# Patient Record
Sex: MALE | Race: WHITE | NOT HISPANIC OR LATINO
[De-identification: names, ages, dates, MRNs, and addresses within clinical notes are randomized per-mention and may not be internally consistent; named-entity substitution may affect disease eponyms.]

---

## 2021-02-09 ENCOUNTER — TRANSCRIPTION ENCOUNTER (OUTPATIENT)
Age: 61
End: 2021-02-09

## 2023-06-02 PROBLEM — Z00.00 ENCOUNTER FOR PREVENTIVE HEALTH EXAMINATION: Status: ACTIVE | Noted: 2023-06-02

## 2023-06-05 ENCOUNTER — NON-APPOINTMENT (OUTPATIENT)
Age: 63
End: 2023-06-05

## 2023-06-05 ENCOUNTER — APPOINTMENT (OUTPATIENT)
Dept: DERMATOLOGY | Facility: CLINIC | Age: 63
End: 2023-06-05
Payer: MEDICAID

## 2023-06-05 DIAGNOSIS — D22.9 MELANOCYTIC NEVI, UNSPECIFIED: ICD-10-CM

## 2023-06-05 DIAGNOSIS — L81.4 OTHER MELANIN HYPERPIGMENTATION: ICD-10-CM

## 2023-06-05 DIAGNOSIS — Z12.83 ENCOUNTER FOR SCREENING FOR MALIGNANT NEOPLASM OF SKIN: ICD-10-CM

## 2023-06-05 DIAGNOSIS — A63.0 ANOGENITAL (VENEREAL) WARTS: ICD-10-CM

## 2023-06-05 PROCEDURE — 54065 DESTRUCTION PENIS LESION(S): CPT | Mod: 59

## 2023-06-05 PROCEDURE — 46916 CRYOSURGERY ANAL LESION(S): CPT | Mod: 59

## 2023-06-05 PROCEDURE — 99203 OFFICE O/P NEW LOW 30 MIN: CPT | Mod: 25

## 2023-06-05 NOTE — ASSESSMENT
[FreeTextEntry1] : 1) benign findings as above- education\par \par 2) anogenital warts\par education and prognosis\par The specified lesions were treated with liquid nitrogen cryotherapy.  Discussed risks including pain, blistering, crusting, discoloration, recurrence.\par \par cautery to skin tag near right eye\par -treated with cautery at setting of 2.5; SED including burning, scarring, and incomplete tx. patient verbalized understanding\par \par f/u 3 weeks

## 2023-06-05 NOTE — PHYSICAL EXAM
[FreeTextEntry3] : AAOx3, pleasant, NAD, no visual lymphadenopathy\par hair, scalp, face, nose, eyelids, ears, lips, oropharynx, neck, chest, abdomen, back, right arm, left arm, nails, and hands examined with all normal findings,\par pertinent findings include:\par \par multiple benign nevi and lentigines\par keratotic papule with black dots on surface on scrotum, penis, and anus

## 2023-06-05 NOTE — HISTORY OF PRESENT ILLNESS
[FreeTextEntry1] : skin check [de-identified] : 62 year old male here for skin check. hx of HPV. present on scrotum, penis and anus.

## 2023-07-10 ENCOUNTER — APPOINTMENT (OUTPATIENT)
Dept: DERMATOLOGY | Facility: CLINIC | Age: 63
End: 2023-07-10

## 2024-04-04 ENCOUNTER — EMERGENCY (EMERGENCY)
Facility: HOSPITAL | Age: 64
LOS: 0 days | Discharge: HOME | End: 2024-04-04
Attending: STUDENT IN AN ORGANIZED HEALTH CARE EDUCATION/TRAINING PROGRAM
Payer: MEDICAID

## 2024-04-04 VITALS
RESPIRATION RATE: 20 BRPM | HEART RATE: 56 BPM | OXYGEN SATURATION: 99 % | TEMPERATURE: 98 F | DIASTOLIC BLOOD PRESSURE: 97 MMHG | SYSTOLIC BLOOD PRESSURE: 149 MMHG

## 2024-04-04 VITALS — HEIGHT: 68 IN | WEIGHT: 175.05 LBS

## 2024-04-04 DIAGNOSIS — N40.0 BENIGN PROSTATIC HYPERPLASIA WITHOUT LOWER URINARY TRACT SYMPTOMS: ICD-10-CM

## 2024-04-04 DIAGNOSIS — G43.109 MIGRAINE WITH AURA, NOT INTRACTABLE, WITHOUT STATUS MIGRAINOSUS: ICD-10-CM

## 2024-04-04 DIAGNOSIS — R51.9 HEADACHE, UNSPECIFIED: ICD-10-CM

## 2024-04-04 DIAGNOSIS — R29.700 NIHSS SCORE 0: ICD-10-CM

## 2024-04-04 LAB
ALBUMIN SERPL ELPH-MCNC: 4.6 G/DL — SIGNIFICANT CHANGE UP (ref 3.3–5)
ALP SERPL-CCNC: 66 U/L — SIGNIFICANT CHANGE UP (ref 40–120)
ALT FLD-CCNC: 37 U/L — SIGNIFICANT CHANGE UP (ref 12–78)
ANION GAP SERPL CALC-SCNC: 5 MMOL/L — SIGNIFICANT CHANGE UP (ref 5–17)
APTT BLD: 30.2 SEC — SIGNIFICANT CHANGE UP (ref 24.5–35.6)
AST SERPL-CCNC: 23 U/L — SIGNIFICANT CHANGE UP (ref 15–37)
BASOPHILS # BLD AUTO: 0.03 K/UL — SIGNIFICANT CHANGE UP (ref 0–0.2)
BASOPHILS NFR BLD AUTO: 0.5 % — SIGNIFICANT CHANGE UP (ref 0–2)
BILIRUB SERPL-MCNC: 0.4 MG/DL — SIGNIFICANT CHANGE UP (ref 0.2–1.2)
BUN SERPL-MCNC: 22 MG/DL — SIGNIFICANT CHANGE UP (ref 7–23)
CALCIUM SERPL-MCNC: 9.7 MG/DL — SIGNIFICANT CHANGE UP (ref 8.5–10.1)
CHLORIDE SERPL-SCNC: 105 MMOL/L — SIGNIFICANT CHANGE UP (ref 96–108)
CO2 SERPL-SCNC: 28 MMOL/L — SIGNIFICANT CHANGE UP (ref 22–31)
CREAT SERPL-MCNC: 1.03 MG/DL — SIGNIFICANT CHANGE UP (ref 0.5–1.3)
EGFR: 82 ML/MIN/1.73M2 — SIGNIFICANT CHANGE UP
EOSINOPHIL # BLD AUTO: 0.15 K/UL — SIGNIFICANT CHANGE UP (ref 0–0.5)
EOSINOPHIL NFR BLD AUTO: 2.7 % — SIGNIFICANT CHANGE UP (ref 0–6)
GLUCOSE SERPL-MCNC: 131 MG/DL — HIGH (ref 70–99)
HCT VFR BLD CALC: 51.8 % — HIGH (ref 39–50)
HGB BLD-MCNC: 18.2 G/DL — HIGH (ref 13–17)
IMM GRANULOCYTES NFR BLD AUTO: 0.5 % — SIGNIFICANT CHANGE UP (ref 0–0.9)
INR BLD: 0.9 RATIO — SIGNIFICANT CHANGE UP (ref 0.85–1.18)
LYMPHOCYTES # BLD AUTO: 1.17 K/UL — SIGNIFICANT CHANGE UP (ref 1–3.3)
LYMPHOCYTES # BLD AUTO: 20.8 % — SIGNIFICANT CHANGE UP (ref 13–44)
MCHC RBC-ENTMCNC: 30 PG — SIGNIFICANT CHANGE UP (ref 27–34)
MCHC RBC-ENTMCNC: 35.1 GM/DL — SIGNIFICANT CHANGE UP (ref 32–36)
MCV RBC AUTO: 85.5 FL — SIGNIFICANT CHANGE UP (ref 80–100)
MONOCYTES # BLD AUTO: 0.53 K/UL — SIGNIFICANT CHANGE UP (ref 0–0.9)
MONOCYTES NFR BLD AUTO: 9.4 % — SIGNIFICANT CHANGE UP (ref 2–14)
NEUTROPHILS # BLD AUTO: 3.72 K/UL — SIGNIFICANT CHANGE UP (ref 1.8–7.4)
NEUTROPHILS NFR BLD AUTO: 66.1 % — SIGNIFICANT CHANGE UP (ref 43–77)
PLATELET # BLD AUTO: 157 K/UL — SIGNIFICANT CHANGE UP (ref 150–400)
POTASSIUM SERPL-MCNC: 4.3 MMOL/L — SIGNIFICANT CHANGE UP (ref 3.5–5.3)
POTASSIUM SERPL-SCNC: 4.3 MMOL/L — SIGNIFICANT CHANGE UP (ref 3.5–5.3)
PROT SERPL-MCNC: 8.2 GM/DL — SIGNIFICANT CHANGE UP (ref 6–8.3)
PROTHROM AB SERPL-ACNC: 10.2 SEC — SIGNIFICANT CHANGE UP (ref 9.5–13)
RBC # BLD: 6.06 M/UL — HIGH (ref 4.2–5.8)
RBC # FLD: 13.2 % — SIGNIFICANT CHANGE UP (ref 10.3–14.5)
SODIUM SERPL-SCNC: 138 MMOL/L — SIGNIFICANT CHANGE UP (ref 135–145)
TROPONIN I, HIGH SENSITIVITY RESULT: 5.68 NG/L — SIGNIFICANT CHANGE UP
WBC # BLD: 5.63 K/UL — SIGNIFICANT CHANGE UP (ref 3.8–10.5)
WBC # FLD AUTO: 5.63 K/UL — SIGNIFICANT CHANGE UP (ref 3.8–10.5)

## 2024-04-04 PROCEDURE — 99285 EMERGENCY DEPT VISIT HI MDM: CPT

## 2024-04-04 PROCEDURE — 80053 COMPREHEN METABOLIC PANEL: CPT

## 2024-04-04 PROCEDURE — 99283 EMERGENCY DEPT VISIT LOW MDM: CPT

## 2024-04-04 PROCEDURE — 99284 EMERGENCY DEPT VISIT MOD MDM: CPT | Mod: 25

## 2024-04-04 PROCEDURE — 85730 THROMBOPLASTIN TIME PARTIAL: CPT

## 2024-04-04 PROCEDURE — 70498 CT ANGIOGRAPHY NECK: CPT | Mod: 26,MC

## 2024-04-04 PROCEDURE — 85025 COMPLETE CBC W/AUTO DIFF WBC: CPT

## 2024-04-04 PROCEDURE — 36415 COLL VENOUS BLD VENIPUNCTURE: CPT

## 2024-04-04 PROCEDURE — 70450 CT HEAD/BRAIN W/O DYE: CPT | Mod: 26,MC,59

## 2024-04-04 PROCEDURE — 96374 THER/PROPH/DIAG INJ IV PUSH: CPT | Mod: XU

## 2024-04-04 PROCEDURE — 0042T: CPT | Mod: MC

## 2024-04-04 PROCEDURE — 70450 CT HEAD/BRAIN W/O DYE: CPT | Mod: MC

## 2024-04-04 PROCEDURE — 96375 TX/PRO/DX INJ NEW DRUG ADDON: CPT | Mod: XU

## 2024-04-04 PROCEDURE — 70496 CT ANGIOGRAPHY HEAD: CPT | Mod: 26,MC

## 2024-04-04 PROCEDURE — 82962 GLUCOSE BLOOD TEST: CPT

## 2024-04-04 PROCEDURE — 84484 ASSAY OF TROPONIN QUANT: CPT

## 2024-04-04 PROCEDURE — 70498 CT ANGIOGRAPHY NECK: CPT | Mod: MC

## 2024-04-04 PROCEDURE — 85610 PROTHROMBIN TIME: CPT

## 2024-04-04 PROCEDURE — 70496 CT ANGIOGRAPHY HEAD: CPT | Mod: MC

## 2024-04-04 RX ORDER — DIPHENHYDRAMINE HCL 50 MG
25 CAPSULE ORAL ONCE
Refills: 0 | Status: COMPLETED | OUTPATIENT
Start: 2024-04-04 | End: 2024-04-04

## 2024-04-04 RX ORDER — ACETAMINOPHEN 500 MG
650 TABLET ORAL ONCE
Refills: 0 | Status: COMPLETED | OUTPATIENT
Start: 2024-04-04 | End: 2024-04-04

## 2024-04-04 RX ORDER — SODIUM CHLORIDE 9 MG/ML
1000 INJECTION INTRAMUSCULAR; INTRAVENOUS; SUBCUTANEOUS ONCE
Refills: 0 | Status: COMPLETED | OUTPATIENT
Start: 2024-04-04 | End: 2024-04-04

## 2024-04-04 RX ORDER — METOCLOPRAMIDE HCL 10 MG
10 TABLET ORAL ONCE
Refills: 0 | Status: COMPLETED | OUTPATIENT
Start: 2024-04-04 | End: 2024-04-04

## 2024-04-04 RX ORDER — KETOROLAC TROMETHAMINE 30 MG/ML
15 SYRINGE (ML) INJECTION ONCE
Refills: 0 | Status: DISCONTINUED | OUTPATIENT
Start: 2024-04-04 | End: 2024-04-04

## 2024-04-04 RX ADMIN — Medication 15 MILLIGRAM(S): at 21:18

## 2024-04-04 RX ADMIN — SODIUM CHLORIDE 1000 MILLILITER(S): 9 INJECTION INTRAMUSCULAR; INTRAVENOUS; SUBCUTANEOUS at 21:07

## 2024-04-04 RX ADMIN — Medication 10 MILLIGRAM(S): at 21:21

## 2024-04-04 RX ADMIN — Medication 650 MILLIGRAM(S): at 21:18

## 2024-04-04 RX ADMIN — Medication 25 MILLIGRAM(S): at 21:19

## 2024-04-04 NOTE — ED PROVIDER NOTE - OBJECTIVE STATEMENT
62 y/o male with PMHx of migraines with aura, BPH presents with acute onset left eye vision loss, painless approximately 1 hours PTA, lasted 20 minutes and is now resolved. Pt was outside then came inside and could not see from left eye, no headache, no numbness no weakness, Pt without complaints at this time

## 2024-04-04 NOTE — ED PROVIDER NOTE - PATIENT PORTAL LINK FT
You can access the FollowMyHealth Patient Portal offered by [facility] by registering at the following website: [email or web address]. By joining [facility]’s FollowMyHealth portal, you will also be able to view your health information using other applications (apps) compatible with our system.

## 2024-04-04 NOTE — ED ADULT NURSE NOTE - NSFALLUNIVINTERV_ED_ALL_ED
Bed/Stretcher in lowest position, wheels locked, appropriate side rails in place/Call bell, personal items and telephone in reach/Instruct patient to call for assistance before getting out of bed/chair/stretcher/Non-slip footwear applied when patient is off stretcher/[place] to call system/Physically safe environment - no spills, clutter or unnecessary equipment/Purposeful proactive rounding/Room/bathroom lighting operational, light cord in reach

## 2024-04-04 NOTE — STROKE CODE NOTE - NSMDCONSULT QTN_Y FT
After Visit Summary   3/3/2017    Jacquie Eugene    MRN: [id number]           Diagnoses this Visit     Iron deficiency anemia following bariatric surgery    -  Primary       Allergies     Vicodin [Hydrocodone-Acetaminophen]     vomiting      Your Patient is a 63y old  Male who presents with a chief complaint of visual disturbance    HPI:  64 yo male PMH migraines with aura, BPH presented with acute onset Left eye visual disturbance. As per pt, he had just walked outside into the sunlight when he noted a "swirling, fractal-like" line under his central vision in his Left eye. States it was painless and resolved after approx 20 minutes. Pt has had similar events int he past but states they were always followed by a headache and this time, they were not. At the time of my exam pt appears to be comfortable, in NAD. Pt denies HA, visual changes, focal numb / ting / weak, word finding difficulty, neck stiffness, photophobia.     NIH - 0        FAMILY HISTORY:  Neg for stroke  Noncontributory         Social Hx:  Nonsmoker, no drug or alcohol use        Allergies  No Known Allergies        MEDICATIONS reviewed         ROS: Pertinent positives in HPI, all other ROS were reviewed and are negative.          Physical Exam:  Constitutional: Awake / alert  HEENT: PERRLA, EOMI  Neck: Supple  Respiratory: Breath sounds are clear bilaterally  Cardiovascular: S1 and S2, regular rhythm  Gastrointestinal: Soft, NT/ND  Extremities:  no edema  Musculoskeletal: no abnormal movements  Skin: No rashes    Neurological Exam:  HF: A x O x 3, follows commands, normal affect, speech fluent  CN: PERRL, EOMI, no NLFD, tongue midline  Motor: Strength 5/5 in all 4 ext, normal bulk and tone  Sens: Intact to light touch  Reflexes: Symmetric and normal, no clonus, no Haynes's   Coord:  No FNFA, dysmetria, AMBER intact   Gait/Balance: Cannot test        Labs:                        18.2   5.63  )-----------( 157      ( 04 Apr 2024 17:34 )             51.8     PT/INR - ( 04 Apr 2024 17:34 )   PT: 10.2 sec;   INR: 0.90 ratio       PTT - ( 04 Apr 2024 17:34 )  PTT:30.2 sec        Radiology report:  CT Brain Stroke Protocol (04.04.24 @ 17:51) >  1. No intracranial hemorrhage is appreciated.  2. No intracranial mass lesion is appreciated.    CT Angio Neck Stroke Protocol w/ IV Cont (04.04.24 @ 18:00) >  1.   Right carotid system: No hemodynamically significant stenosis.  2.   Left carotid system:     Atherosclerotic plaque carotid bulb without    hemodynamically significant stenosis.  3.   Vertebral circulation:    Patent.  4.  Anterior intracranial circulation:     Suspect early intracranial   atherosclerosis right MCA branching.  5.  Posterior intracranial circulation:    Unremarkable.  6.  No large vessel occlusion  7.  Brain perfusion:   No acute infarction of the brain is convincingly   demonstrated.        A/P:  64 yo male PMH migraines with aura, BPH presented with acute onset Left eye visual disturbance. As per pt, he had just walked outside into the sunlight when he noted a "swirling, fractal-like" line under his central vision in his Left eye. States it was painless and resolved after approx 20 minutes. Pt has had similar events int he past but states they were always followed by a headache and this time, they were not. CT / CTA neg for acute path    - No IV tpa given because NIH 0, symptoms more c/w occular migraine than stroke  - No further w/u indicated at this time  - Pt can f/u with neurologist on a non-urgent basis for migraines  - Instructed pt if symptoms return or persist to seek medical attention  - Now stable and clear for discharge from neurologic standpoint with close outpatient follow up.     Discussed with Dr Sahu

## 2024-04-04 NOTE — ED PROVIDER NOTE - PROGRESS NOTE DETAILS
Pt evaluated by Dr. Sahu neurology attending, all imaging reviewed, no infarct, likely occular migraine, pt is safe for discharge. On reassessment pt now has HA, will provide migraine cocktail and plan for dc when pt feeling better Dr. Price ED attending [place]: Received signout from Dr. Price that after meds for migraine, patient may be discharged if feeling better.  Meds and IV fluids given. Patient on his phone, comfortable, no complaints.  May be discharged after IV fluids.

## 2024-04-04 NOTE — ED PROVIDER NOTE - CARE PROVIDER_API CALL
Emmett Sahu  Neurology  [address] [place] [address], [facility] [address]  [place] [address]  Phone: [phone]  Fax: [phone]  Follow Up Time:

## 2024-04-04 NOTE — ED ADULT NURSE NOTE - CHIEF COMPLAINT QUOTE
Patient presents to the ER with complaints of vision loss to left eye approx 20 minutes prior to arrival which resolved at this time. NIHSS at this time 0. Patient denies chest pain, shortness of breath, N/V/D, fever. Hx: migraines  MD Price notified for eval, code stroke called at [phone].

## 2024-04-04 NOTE — ED ADULT NURSE NOTE - NS ED NURSE LEVEL OF CONSCIOUSNESS AFFECT
Calm/Appropriate

## 2024-04-04 NOTE — ED PROVIDER NOTE - NSFOLLOWUPINSTRUCTIONS_ED_ALL_ED_FT
See your neurologist within 1-2 days.  Take tylenol or ibuprofen or aleve for headache as needed.   Keep well hydrated.  Referral given for neurologist if you do not have one.     Migraine Headache  A migraine headache is a very strong throbbing pain on one or both sides of your head. This type of headache can also cause other symptoms. It can last from 4 hours to 3 days. Talk with your doctor about what things may bring on (trigger) this condition.    What are the causes?  The exact cause of a migraine is not known. This condition may be brought on or caused by:  Smoking.  Medicines, such as:  Medicine used to treat chest pain (nitroglycerin).  Birth control pills.  Estrogen.  Some blood pressure medicines.  Certain substances in some foods or drinks.  Foods and drinks, such as:  Cheese.  Chocolate.  Alcohol.  Caffeine.  Doing physical activity that is very hard.  Other things that may trigger a migraine headache include:  Periods.  Pregnancy.  Hunger.  Stress.  Getting too much or too little sleep.  Weather changes.  Feeling tired (fatigue).  What increases the risk?  Being 25–55 years old.  Being female.  Having a family history of migraine headaches.  Being .  Having a mental health condition, such as being sad (depressed) or feeling worried or nervous (anxious).  Being very overweight (obese).  What are the signs or symptoms?  A throbbing pain. This pain may:  Happen in any area of the head, such as on one or both sides.  Make it hard to do daily activities.  Get worse with physical activity.  Get worse around bright lights, loud noises, or smells.  Other symptoms may include:  Feeling like you may vomit (nauseous).  Vomiting.  Dizziness.  Before a migraine headache starts, you may get warning signs (an aura). An aura may include:  Seeing flashing lights or having blind spots.  Seeing bright spots, halos, or zigzag lines.  Having tunnel vision or blurred vision.  Having numbness or a tingling feeling.  Having trouble talking.  Having weak muscles.  After a migraine ends, you may have symptoms. These may include:  Tiredness.  Trouble thinking (concentrating).  How is this treated?  Taking medicines that:  Relieve pain.  Relieve the feeling like you may vomit.  Prevent migraine headaches.  Treatment may also include:  Acupuncture.  Lifestyle changes like avoiding foods that bring on migraine headaches.  Learning ways to control your body functions (biofeedback).  Therapy to help you know and deal with negative thoughts (cognitive behavioral therapy).  Follow these instructions at home:  Medicines    Take over-the-counter and prescription medicines only as told by your doctor.  If told, take steps to prevent problems with pooping (constipation). You may need to:  Drink enough fluid to keep your pee (urine) pale yellow.  Take medicines. You will be told what medicines to take.  Eat foods that are high in fiber. These include beans, whole grains, and fresh fruits and vegetables.  Limit foods that are high in fat and sugar. These include fried or sweet foods.  Ask your doctor if you should avoid driving or using machines while you are taking your medicine.    Lifestyle    A person sitting on the floor doing yoga.  Do not drink alcohol.  Do not smoke or use any products that contain nicotine or tobacco. If you need help quitting, ask your doctor.  Get 7–9 hours of sleep each night, or the amount recommended by your doctor.  Find ways to deal with stress, such as meditation, deep breathing, or yoga.  Try to exercise often. This can help lessen how bad and how often your migraines happen.  General instructions    Keep a journal to find out what may bring on your migraine headaches. This can help you avoid those things. For example, write down:  What you eat and drink.  How much sleep you get.  Any change to your medicines or diet.  If you have a migraine headache:  Avoid things that make your symptoms worse, such as bright lights.  Lie down in a dark, quiet room.  Do not drive or use machinery.  Ask your doctor what activities are safe for you.  Where to find more information  Coalition for Headache and Migraine Patients (CHAMP): headachemigraine.org  American Migraine Foundation: americanmigrainefoundation.org  National Headache Foundation: headaches.org  Contact a doctor if:  You get a migraine headache that is different or worse than others you have had.  You have more than 15 days of headaches in one month.  Get help right away if:  Your migraine headache gets very bad.  Your migraine headache lasts more than 72 hours.  You have a fever or stiff neck.  You have trouble seeing.  Your muscles feel weak or like you cannot control them.  You lose your balance a lot.  You have trouble walking.  You faint.  You have a seizure.  This information is not intended to replace advice given to you by your health care provider. Make sure you discuss any questions you have with your health care provider.    Document Revised: 08/14/2023 Document Reviewed: 08/14/2023  Elsevier Patient Education © 2024 [employer]  [employer] logo  Terms and Conditions  Privacy Policy  Editorial Policy  All content on this site: Copyright © 2024 Elsevier, its licensors, and contributors. All rights are reserved, including those for text and data mining, AI training, and similar technologies. For all open access content, the Creative Commons licensing terms apply.  Cookies are used by this site. To decline or learn more, visit our Cookies page.  [employer]

## 2024-04-04 NOTE — ED ADULT NURSE NOTE - OBJECTIVE STATEMENT
Pt presents c/o stroke-like symptoms. Pt stated at around 17:00, he suddenly developed blurred vision with pale spots, and his left pupil was larger than his other. This lasted for about 10 minutes and then returned to normal. Pt did not have pain afterwords like previous migraines in the pas and came to  for evaluation. Pt had PMH of migraines, last one over 14 years ago.

## 2024-04-04 NOTE — ED ADULT TRIAGE NOTE - CHIEF COMPLAINT QUOTE
Patient presents to the ER with complaints of vision loss to left eye approx 20 minutes prior to arrival which resolved at this time. NIHSS at this time 0. Patient denies chest pain, shortness of breath, N/V/D, fever. Hx: migraines  MD Price notified for eval, code stroke called at [phone].

## 2024-06-07 ENCOUNTER — NON-APPOINTMENT (OUTPATIENT)
Age: 64
End: 2024-06-07

## 2024-06-11 ENCOUNTER — NON-APPOINTMENT (OUTPATIENT)
Age: 64
End: 2024-06-11

## 2024-06-11 ENCOUNTER — APPOINTMENT (OUTPATIENT)
Dept: ORTHOPEDIC SURGERY | Facility: CLINIC | Age: 64
End: 2024-06-11
Payer: MEDICAID

## 2024-06-11 DIAGNOSIS — M17.0 BILATERAL PRIMARY OSTEOARTHRITIS OF KNEE: ICD-10-CM

## 2024-06-11 PROCEDURE — 99204 OFFICE O/P NEW MOD 45 MIN: CPT

## 2024-06-11 PROCEDURE — 73564 X-RAY EXAM KNEE 4 OR MORE: CPT | Mod: 50

## 2024-06-11 NOTE — HISTORY OF PRESENT ILLNESS
[de-identified] : 62yo male presents complaining of bilateral knee pain left worse than right for several years.  He reports being very active enjoys hiking walking cycling.  Pain is anterior medial aspect of both knees.  Reports intermittent swelling.  He states he is done physical therapy in the past to focus on quadricep strengthening.  He is on cortisone shots in the past as well with minimal relief.  He states insurance does not approve hyaluronic acid injections.  No specific injuries.  He is here today for further evaluation.  Is looking for a more permanent fix for his issues today.  Denies numbness tingling  The patient's past medical history, past surgical history, medications, allergies, and social history were reviewed by me today with the patient and documented accordingly. In addition, the patient's family history, which is noncontributory to this visit, was also reviewed.

## 2024-06-11 NOTE — DISCUSSION/SUMMARY
[de-identified] : Bilateral knee osteoarthritis left more symptomatic than right.  I discussed the treatment of degenerative arthritis with the patient at length today. I described the spectrum of treatment from nonoperative modalities to total joint arthroplasty. Noninvasive and nonoperative treatment modalities include weight reduction, activity modification with low impact exercise, PRN use of acetaminophen or anti-inflammatory medication if tolerated, glucosamine/chondroitin supplements, and physical therapy. Further treatments can include corticosteroid injection and the use of hyaluronic acid injections. Definitive treatment can certainly include total joint arthroplasty also. The risks and benefits of each treatment options was discussed and all questions were answered.  The patient is indicated for total knee arthroplasty beginning with the more symptomatic left knee. We discussed the surgery postoperative protocol restrictions in length. Risks benefits alternatives of surgery discussed including but not limited to risks such as bleeding infection nerve and vessel injury continued pain stiffness need for future surgery medical complications such as DVT or PE and anesthesia complications. We reviewed the plan of care and used a model of a knee replacement that will be be used for the joint replacement. We did discuss implant choice and fixation method with shared decision-making with myself and the patient. We discussed the use of cement fixation. We discussed discharge options and plan. The patient agrees with this plan of care as well these implants other total knee replacement

## 2024-06-11 NOTE — PHYSICAL EXAM
[de-identified] : General Exam  Well developed, well nourished  No apparent distress  Oriented to person, place, and time  Mood: Normal  Affect: Normal  Balance and coordination: Normal  Gait: Normal  left knee exam    Skin: Clean, dry, intact Inspection: No obvious malalignment, no masses, no swelling, no effusion.  Tenderness: + MJLT. No LJLT. No tenderness over the medial and lateral patella facets. No ttp medial/lateral epicondyle, patella tendon, tibial tubercle, pes anserinus, or proximal fibula.  ROM: 0 to 130  pain with deep flexion  Stability: Stable to varus, valgus, lachman testing. Negative anterior/posterior drawer.  Additional tests: Negative McMurrays test, Negative patellar grind test.   Strength: 5/5 Q/H/TA/GS/EHL, no atrophy  Neuro: In tact to light touch throughout in dp/sp/tib/manish/saph nerve districutions,  DTR's normal Pulses: 2+ DP/PT pulses.  right knee exam    Skin: Clean, dry, intact Inspection: No obvious malalignment, no masses, no swelling, no effusion.  Tenderness: + MJLT. No LJLT. No tenderness over the medial and lateral patella facets. No ttp medial/lateral epicondyle, patella tendon, tibial tubercle, pes anserinus, or proximal fibula.  ROM: 0 to 130  pain with deep flexion  Stability: Stable to varus, valgus, lachman testing. Negative anterior/posterior drawer.  Additional tests: Negative McMurrays test, Negative patellar grind test.   Strength: 5/5 Q/H/TA/GS/EHL, no atrophy  Neuro: In tact to light touch throughout in dp/sp/tib/manish/saph nerve districutions,  DTR's normal Pulses: 2+ DP/PT pulses.  [de-identified] : The following radiographs were ordered and read by me during this patients visit. I reviewed each radiograph in detail with the patient and discussed the findings as highlighted below.  4 views both knees were obtained today there is severe osteoarthritis complete loss of medial joint space osteophyte sclerosis.  Evidence of prior anterior crucial ligament reconstruction on the left knee with a femoral button and stable screw construct on the tibia

## 2024-07-02 ENCOUNTER — NON-APPOINTMENT (OUTPATIENT)
Age: 64
End: 2024-07-02

## 2024-08-23 ENCOUNTER — APPOINTMENT (OUTPATIENT)
Dept: ORTHOPEDIC SURGERY | Facility: CLINIC | Age: 64
End: 2024-08-23
Payer: MEDICAID

## 2024-08-23 ENCOUNTER — NON-APPOINTMENT (OUTPATIENT)
Age: 64
End: 2024-08-23

## 2024-08-23 DIAGNOSIS — M17.0 BILATERAL PRIMARY OSTEOARTHRITIS OF KNEE: ICD-10-CM

## 2024-08-23 PROCEDURE — 99441: CPT

## 2024-10-15 ENCOUNTER — OUTPATIENT (OUTPATIENT)
Dept: OUTPATIENT SERVICES | Facility: HOSPITAL | Age: 64
LOS: 1 days | Discharge: HOME | End: 2024-10-15
Payer: MEDICAID

## 2024-10-15 VITALS
TEMPERATURE: 98 F | HEIGHT: 68 IN | HEART RATE: 62 BPM | SYSTOLIC BLOOD PRESSURE: 135 MMHG | OXYGEN SATURATION: 100 % | DIASTOLIC BLOOD PRESSURE: 89 MMHG | WEIGHT: 172.18 LBS | RESPIRATION RATE: 18 BRPM

## 2024-10-15 DIAGNOSIS — Z01.818 ENCOUNTER FOR OTHER PREPROCEDURAL EXAMINATION: ICD-10-CM

## 2024-10-15 DIAGNOSIS — M17.0 BILATERAL PRIMARY OSTEOARTHRITIS OF KNEE: ICD-10-CM

## 2024-10-15 DIAGNOSIS — Z87.438 PERSONAL HISTORY OF OTHER DISEASES OF MALE GENITAL ORGANS: ICD-10-CM

## 2024-10-15 LAB
A1C WITH ESTIMATED AVERAGE GLUCOSE RESULT: 5.4 % — SIGNIFICANT CHANGE UP (ref 4–5.6)
ALBUMIN SERPL ELPH-MCNC: 4.2 G/DL — SIGNIFICANT CHANGE UP (ref 3.3–5)
ALP SERPL-CCNC: 57 U/L — SIGNIFICANT CHANGE UP (ref 40–120)
ALT FLD-CCNC: 30 U/L — SIGNIFICANT CHANGE UP (ref 12–78)
ANION GAP SERPL CALC-SCNC: 3 MMOL/L — LOW (ref 5–17)
APTT BLD: 31.5 SEC — SIGNIFICANT CHANGE UP (ref 24.5–35.6)
AST SERPL-CCNC: 19 U/L — SIGNIFICANT CHANGE UP (ref 15–37)
BASOPHILS # BLD AUTO: 0.02 K/UL — SIGNIFICANT CHANGE UP (ref 0–0.2)
BASOPHILS NFR BLD AUTO: 0.4 % — SIGNIFICANT CHANGE UP (ref 0–2)
BILIRUB SERPL-MCNC: 0.6 MG/DL — SIGNIFICANT CHANGE UP (ref 0.2–1.2)
BLD GP AB SCN SERPL QL: SIGNIFICANT CHANGE UP
BUN SERPL-MCNC: 20 MG/DL — SIGNIFICANT CHANGE UP (ref 7–23)
CALCIUM SERPL-MCNC: 9.5 MG/DL — SIGNIFICANT CHANGE UP (ref 8.5–10.1)
CHLORIDE SERPL-SCNC: 107 MMOL/L — SIGNIFICANT CHANGE UP (ref 96–108)
CO2 SERPL-SCNC: 29 MMOL/L — SIGNIFICANT CHANGE UP (ref 22–31)
CREAT SERPL-MCNC: 0.96 MG/DL — SIGNIFICANT CHANGE UP (ref 0.5–1.3)
EGFR: 88 ML/MIN/1.73M2 — SIGNIFICANT CHANGE UP
EOSINOPHIL # BLD AUTO: 0.08 K/UL — SIGNIFICANT CHANGE UP (ref 0–0.5)
EOSINOPHIL NFR BLD AUTO: 1.7 % — SIGNIFICANT CHANGE UP (ref 0–6)
ESTIMATED AVERAGE GLUCOSE: 108 MG/DL — SIGNIFICANT CHANGE UP (ref 68–114)
GLUCOSE SERPL-MCNC: 93 MG/DL — SIGNIFICANT CHANGE UP (ref 70–99)
HCT VFR BLD CALC: 49 % — SIGNIFICANT CHANGE UP (ref 39–50)
HGB BLD-MCNC: 17 G/DL — SIGNIFICANT CHANGE UP (ref 13–17)
IMM GRANULOCYTES NFR BLD AUTO: 0.4 % — SIGNIFICANT CHANGE UP (ref 0–0.9)
INR BLD: 1.01 RATIO — SIGNIFICANT CHANGE UP (ref 0.85–1.16)
LYMPHOCYTES # BLD AUTO: 0.9 K/UL — LOW (ref 1–3.3)
LYMPHOCYTES # BLD AUTO: 19.6 % — SIGNIFICANT CHANGE UP (ref 13–44)
MCHC RBC-ENTMCNC: 29.4 PG — SIGNIFICANT CHANGE UP (ref 27–34)
MCHC RBC-ENTMCNC: 34.7 G/DL — SIGNIFICANT CHANGE UP (ref 32–36)
MCV RBC AUTO: 84.8 FL — SIGNIFICANT CHANGE UP (ref 80–100)
MONOCYTES # BLD AUTO: 0.46 K/UL — SIGNIFICANT CHANGE UP (ref 0–0.9)
MONOCYTES NFR BLD AUTO: 10 % — SIGNIFICANT CHANGE UP (ref 2–14)
MRSA PCR RESULT.: SIGNIFICANT CHANGE UP
NEUTROPHILS # BLD AUTO: 3.12 K/UL — SIGNIFICANT CHANGE UP (ref 1.8–7.4)
NEUTROPHILS NFR BLD AUTO: 67.9 % — SIGNIFICANT CHANGE UP (ref 43–77)
NRBC # BLD: 0 /100 WBCS — SIGNIFICANT CHANGE UP (ref 0–0)
PLATELET # BLD AUTO: 142 K/UL — LOW (ref 150–400)
POTASSIUM SERPL-MCNC: 4.5 MMOL/L — SIGNIFICANT CHANGE UP (ref 3.5–5.3)
POTASSIUM SERPL-SCNC: 4.5 MMOL/L — SIGNIFICANT CHANGE UP (ref 3.5–5.3)
PROT SERPL-MCNC: 7.4 GM/DL — SIGNIFICANT CHANGE UP (ref 6–8.3)
PROTHROM AB SERPL-ACNC: 11.7 SEC — SIGNIFICANT CHANGE UP (ref 9.9–13.4)
RBC # BLD: 5.78 M/UL — SIGNIFICANT CHANGE UP (ref 4.2–5.8)
RBC # FLD: 13.2 % — SIGNIFICANT CHANGE UP (ref 10.3–14.5)
S AUREUS DNA NOSE QL NAA+PROBE: SIGNIFICANT CHANGE UP
SODIUM SERPL-SCNC: 139 MMOL/L — SIGNIFICANT CHANGE UP (ref 135–145)
VIT D25+D1,25 OH+D1,25 PNL SERPL-MCNC: 62.2 PG/ML — SIGNIFICANT CHANGE UP (ref 19.9–79.3)
WBC # BLD: 4.6 K/UL — SIGNIFICANT CHANGE UP (ref 3.8–10.5)
WBC # FLD AUTO: 4.6 K/UL — SIGNIFICANT CHANGE UP (ref 3.8–10.5)

## 2024-10-15 PROCEDURE — 93010 ELECTROCARDIOGRAM REPORT: CPT

## 2024-10-15 NOTE — OCCUPATIONAL THERAPY INITIAL EVALUATION ADULT - NSOTDMEREC_GEN_A_CORE
The patient has mobility limitations that increase their falls risk and impair their endurance. At times, they are limited to staying in one room due to fluctuating pain levels & balance deficits related to post operative weakness. The patient will require a commode to attend safely to their toileting needs. The patient has a mobility limitation that significantly impairs their ability to participate independently in mobility-related activities of daily living (MRADLs) in the home. This functional deficit can be sufficiently resolved with the use of 2-wheeled rolling walker.

## 2024-10-15 NOTE — H&P PST ADULT - PROBLEM SELECTOR PLAN 1
Labs-CBC, BMP, PT/INR, PTT ,T&S,HgA1C, Nose Cx, EKG   M/C required    Preop Hibiclens x 3 day instructions reviewed and given. Instructed on if Cx is positive use Mupirocin 5 days and checklist given in booklet   Take routine meds DOS with small sips of water, avoid NSAIDs and OTC supplements, verbalized understanding information on proper nutrition, increase protein and better food choices provided in booklet.    Ensure clear given   Anesthesiologist to review PST labs, EKG, required clearances, and optimization for surgery

## 2024-10-15 NOTE — OCCUPATIONAL THERAPY INITIAL EVALUATION ADULT - ADDITIONAL COMMENTS
Pt lives with girlfriend (Who can assist post op) in a private house with 7 steps with no handrails to enter. Once inside, the pt has 5 steps with a R handrail to reach the main floor where the bedroom and bathroom is. The pt bathroom has a walk in shower stall, fixed shower head, standard toilet seat and no grab bars. The pt ambulates with no device and does not own any device for ambulation. The pt daily pain is a 0/10 at rest and a 5/10 with movement. The pt manages the pain with rest, Advil and Tylenol. The pt has no recent outpatient PT, no recent falls and has no buckling of the knees. The pt wears glasses all the time, L handed, drives and has hearing aids in both ears.

## 2024-10-15 NOTE — OCCUPATIONAL THERAPY INITIAL EVALUATION ADULT - PERTINENT HX OF CURRENT PROBLEM, REHAB EVAL
L knee OA which impacts pts ability to perform functional tasks/transfers and mobility. Pt is scheduled for L TKR on 11/4/24.

## 2024-10-15 NOTE — H&P PST ADULT - HISTORY OF PRESENT ILLNESS
64M PMH BPH presents to PST for scheduled left total knee arthroplasty on 11/4/24 with      goal: to walk without pain

## 2024-10-15 NOTE — H&P PST ADULT - ASSESSMENT
64M [facility] BPH presents to [facility] for scheduled left total knee arthroplasty on 24 with Dr.Cohn CAPRINI SCORE    AGE RELATED RISK FACTORS                                                             [ ] Age 41-60 years                                            (1 Point)  [ x] Age: 61-74 years                                           (2 Points)                 [ ] Age= 75 years                                                (3 Points)             DISEASE RELATED RISK FACTORS                                                       [ ] Edema in the lower extremities                 (1 Point)                     [ ] Varicose veins                                               (1 Point)                                 [ x] BMI > 25 Kg/m2                                            (1 Point)                                  [ ] Serious infection (ie PNA)                            (1 Point)                     [ ] Lung disease ( COPD, Emphysema)            (1 Point)                                                                          [ ] Acute myocardial infarction                         (1 Point)                  [ ] Congestive heart failure (in the previous month)  (1 Point)         [ ] Inflammatory bowel disease                            (1 Point)                  [ ] Central venous access, PICC or Port               (2 points)       (within the last month)                                                                [ ] Stroke (in the previous month)                        (5 Points)    [ ] Previous or present malignancy                       (2 points)                                                                                                                                                         HEMATOLOGY RELATED FACTORS                                                         [ ] Prior episodes of VTE                                     (3 Points)                     [ ] Positive family history for VTE                      (3 Points)                  [ ] Prothrombin 91410 A                                     (3 Points)                     [ ] Factor V Leiden                                                (3 Points)                        [ ] Lupus anticoagulants                                      (3 Points)                                                           [ ] Anticardiolipin antibodies                              (3 Points)                                                       [ ] High homocysteine in the blood                   (3 Points)                                             [ ] Other congenital or acquired thrombophilia      (3 Points)                                                [ ] Heparin induced thrombocytopenia                  (3 Points)                                        MOBILITY RELATED FACTORS  [ ] Bed rest                                                         (1 Point)  [ ] Plaster cast                                                    (2 points)  [ ] Bed bound for more than 72 hours           (2 Points)    GENDER SPECIFIC FACTORS  [ ] Pregnancy or had a baby within the last month   (1 Point)  [ ] Post-partum < 6 weeks                                   (1 Point)  [ ] Hormonal therapy  or oral contraception   (1 Point)  [ ] History of pregnancy complications              (1 point)  [ ] Unexplained or recurrent              (1 Point)    OTHER RISK FACTORS                                           (1 Point)  [ ] BMI >40, smoking, diabetes requiring insulin, chemotherapy  blood transfusions and length of surgery over 2 hours    SURGERY RELATED RISK FACTORS  [ ]  Section within the last month     (1 Point)  [ ] Minor surgery                                                  (1 Point)  [ ] Arthroscopic surgery                                       (2 Points)  [ ] Planned major surgery lasting more            (2 Points)      than 45 minutes     [ x] Elective hip or knee joint replacement       (5 points)       surgery                                                TRAUMA RELATED RISK FACTORS  [ ] Fracture of the hip, pelvis, or leg                       (5 Points)  [ ] Spinal cord injury resulting in paralysis             (5 points)       (in the previous month)    [ ] Paralysis  (less than 1 month)                             (5 Points)  [ ] Multiple Trauma within 1 month                        (5 Points)    Total Score [   8    ]    Caprini Score 0-2: Low Risk, NO VTE prophylaxis required for most patients, encourage ambulation  Caprini Score 3-6: Moderate Risk , pharmacologic VTE prophylaxis is indicated for most patients (in the absence of contraindications)  Caprini Score Greater than or =7: High risk, pharmocologic VTE prophylaxis indicated for most patients (in the absence of contraindications)

## 2024-10-29 ENCOUNTER — NON-APPOINTMENT (OUTPATIENT)
Age: 64
End: 2024-10-29

## 2024-11-04 ENCOUNTER — APPOINTMENT (OUTPATIENT)
Dept: ORTHOPEDIC SURGERY | Facility: HOSPITAL | Age: 64
End: 2024-11-04

## 2024-11-04 ENCOUNTER — INPATIENT (INPATIENT)
Facility: HOSPITAL | Age: 64
LOS: 0 days | Discharge: HOME HEALTH | End: 2024-11-04
Attending: ORTHOPAEDIC SURGERY | Admitting: ORTHOPAEDIC SURGERY
Payer: MEDICAID

## 2024-11-04 ENCOUNTER — TRANSCRIPTION ENCOUNTER (OUTPATIENT)
Age: 64
End: 2024-11-04

## 2024-11-04 VITALS
HEART RATE: 76 BPM | OXYGEN SATURATION: 95 % | TEMPERATURE: 98 F | SYSTOLIC BLOOD PRESSURE: 154 MMHG | DIASTOLIC BLOOD PRESSURE: 80 MMHG | RESPIRATION RATE: 17 BRPM

## 2024-11-04 VITALS
OXYGEN SATURATION: 97 % | WEIGHT: 171.08 LBS | TEMPERATURE: 98 F | HEIGHT: 68 IN | HEART RATE: 59 BPM | SYSTOLIC BLOOD PRESSURE: 159 MMHG | RESPIRATION RATE: 17 BRPM | DIASTOLIC BLOOD PRESSURE: 82 MMHG

## 2024-11-04 DIAGNOSIS — Z98.890 OTHER SPECIFIED POSTPROCEDURAL STATES: Chronic | ICD-10-CM

## 2024-11-04 LAB
ANION GAP SERPL CALC-SCNC: 5 MMOL/L — SIGNIFICANT CHANGE UP (ref 5–17)
BUN SERPL-MCNC: 16 MG/DL — SIGNIFICANT CHANGE UP (ref 7–23)
CALCIUM SERPL-MCNC: 8.3 MG/DL — LOW (ref 8.5–10.1)
CHLORIDE SERPL-SCNC: 110 MMOL/L — HIGH (ref 96–108)
CO2 SERPL-SCNC: 26 MMOL/L — SIGNIFICANT CHANGE UP (ref 22–31)
CREAT SERPL-MCNC: 0.97 MG/DL — SIGNIFICANT CHANGE UP (ref 0.5–1.3)
EGFR: 87 ML/MIN/1.73M2 — SIGNIFICANT CHANGE UP
GLUCOSE BLDC GLUCOMTR-MCNC: 140 MG/DL — HIGH (ref 70–99)
GLUCOSE SERPL-MCNC: 82 MG/DL — SIGNIFICANT CHANGE UP (ref 70–99)
HCT VFR BLD CALC: 41.4 % — SIGNIFICANT CHANGE UP (ref 39–50)
HGB BLD-MCNC: 14.7 G/DL — SIGNIFICANT CHANGE UP (ref 13–17)
MCHC RBC-ENTMCNC: 30.2 PG — SIGNIFICANT CHANGE UP (ref 27–34)
MCHC RBC-ENTMCNC: 35.5 G/DL — SIGNIFICANT CHANGE UP (ref 32–36)
MCV RBC AUTO: 85.2 FL — SIGNIFICANT CHANGE UP (ref 80–100)
NRBC # BLD: 0 /100 WBCS — SIGNIFICANT CHANGE UP (ref 0–0)
PLATELET # BLD AUTO: 115 K/UL — LOW (ref 150–400)
POTASSIUM SERPL-MCNC: 4.7 MMOL/L — SIGNIFICANT CHANGE UP (ref 3.5–5.3)
POTASSIUM SERPL-SCNC: 4.7 MMOL/L — SIGNIFICANT CHANGE UP (ref 3.5–5.3)
RBC # BLD: 4.86 M/UL — SIGNIFICANT CHANGE UP (ref 4.2–5.8)
RBC # FLD: 13.2 % — SIGNIFICANT CHANGE UP (ref 10.3–14.5)
SODIUM SERPL-SCNC: 141 MMOL/L — SIGNIFICANT CHANGE UP (ref 135–145)
WBC # BLD: 6.38 K/UL — SIGNIFICANT CHANGE UP (ref 3.8–10.5)
WBC # FLD AUTO: 6.38 K/UL — SIGNIFICANT CHANGE UP (ref 3.8–10.5)

## 2024-11-04 PROCEDURE — 27447 TOTAL KNEE ARTHROPLASTY: CPT | Mod: LT

## 2024-11-04 PROCEDURE — 73560 X-RAY EXAM OF KNEE 1 OR 2: CPT | Mod: 26,LT

## 2024-11-04 DEVICE — STRYKER PIN 1/8 X 3.5" LONG: Type: IMPLANTABLE DEVICE | Site: LEFT | Status: FUNCTIONAL

## 2024-11-04 DEVICE — COMP FEM TRIATHLON PS SZ 4 LT: Type: IMPLANTABLE DEVICE | Site: LEFT | Status: FUNCTIONAL

## 2024-11-04 DEVICE — CEMENT SIMPLEX WITH TOBRAMYCIN: Type: IMPLANTABLE DEVICE | Site: LEFT | Status: FUNCTIONAL

## 2024-11-04 DEVICE — INSERT TIB BEARING PS X3 SZ 4 11MM: Type: IMPLANTABLE DEVICE | Site: LEFT | Status: FUNCTIONAL

## 2024-11-04 DEVICE — IMP PATELLA SYMMETRIC X3 31X9MM: Type: IMPLANTABLE DEVICE | Site: LEFT | Status: FUNCTIONAL

## 2024-11-04 DEVICE — BASEPLATE TIB UNIV TRIATHLON SZ 4: Type: IMPLANTABLE DEVICE | Site: LEFT | Status: FUNCTIONAL

## 2024-11-04 RX ORDER — OXYCODONE HYDROCHLORIDE 30 MG/1
5 TABLET ORAL
Refills: 0 | Status: DISCONTINUED | OUTPATIENT
Start: 2024-11-04 | End: 2024-11-04

## 2024-11-04 RX ORDER — ONDANSETRON HYDROCHLORIDE 2 MG/ML
4 INJECTION, SOLUTION INTRAMUSCULAR; INTRAVENOUS EVERY 6 HOURS
Refills: 0 | Status: DISCONTINUED | OUTPATIENT
Start: 2024-11-04 | End: 2024-11-04

## 2024-11-04 RX ORDER — FAMOTIDINE 10 MG/ML
1 INJECTION INTRAVENOUS
Qty: 0 | Refills: 0 | DISCHARGE

## 2024-11-04 RX ORDER — HYDROMORPHONE HCL/0.9% NACL/PF 6 MG/30 ML
0.5 PATIENT CONTROLLED ANALGESIA SYRINGE INTRAVENOUS
Refills: 0 | Status: DISCONTINUED | OUTPATIENT
Start: 2024-11-04 | End: 2024-11-04

## 2024-11-04 RX ORDER — MAGNESIUM, ALUMINUM HYDROXIDE 200-200 MG
30 TABLET,CHEWABLE ORAL
Refills: 0 | Status: DISCONTINUED | OUTPATIENT
Start: 2024-11-04 | End: 2024-11-04

## 2024-11-04 RX ORDER — ASPIRIN/MAG CARB/ALUMINUM AMIN 325 MG
1 TABLET ORAL
Qty: 60 | Refills: 0
Start: 2024-11-04 | End: 2024-12-03

## 2024-11-04 RX ORDER — DOCUSATE SODIUM 100 MG
1 CAPSULE ORAL
Qty: 20 | Refills: 0
Start: 2024-11-04

## 2024-11-04 RX ORDER — TADALAFIL 10 MG/1
1 TABLET, FILM COATED ORAL
Qty: 0 | Refills: 0 | DISCHARGE

## 2024-11-04 RX ORDER — FAMOTIDINE 10 MG/ML
20 INJECTION INTRAVENOUS ONCE
Refills: 0 | Status: DISCONTINUED | OUTPATIENT
Start: 2024-11-04 | End: 2024-11-04

## 2024-11-04 RX ORDER — SODIUM CHLORIDE 9 MG/ML
3 INJECTION, SOLUTION INTRAMUSCULAR; INTRAVENOUS; SUBCUTANEOUS EVERY 8 HOURS
Refills: 0 | Status: DISCONTINUED | OUTPATIENT
Start: 2024-11-04 | End: 2024-11-04

## 2024-11-04 RX ORDER — TRAMADOL HYDROCHLORIDE 50 MG/1
50 TABLET, COATED ORAL EVERY 6 HOURS
Refills: 0 | Status: DISCONTINUED | OUTPATIENT
Start: 2024-11-04 | End: 2024-11-04

## 2024-11-04 RX ORDER — HYDROMORPHONE HCL/0.9% NACL/PF 6 MG/30 ML
1 PATIENT CONTROLLED ANALGESIA SYRINGE INTRAVENOUS
Refills: 0 | Status: DISCONTINUED | OUTPATIENT
Start: 2024-11-04 | End: 2024-11-04

## 2024-11-04 RX ORDER — ASPIRIN/MAG CARB/ALUMINUM AMIN 325 MG
81 TABLET ORAL EVERY 12 HOURS
Refills: 0 | Status: DISCONTINUED | OUTPATIENT
Start: 2024-11-04 | End: 2024-11-04

## 2024-11-04 RX ORDER — ASPIRIN/MAG CARB/ALUMINUM AMIN 325 MG
81 TABLET ORAL EVERY 12 HOURS
Refills: 0 | Status: DISCONTINUED | OUTPATIENT
Start: 2024-11-05 | End: 2024-11-04

## 2024-11-04 RX ORDER — B-COMPLEX WITH VITAMIN C
1 VIAL (ML) INJECTION DAILY
Refills: 0 | Status: DISCONTINUED | OUTPATIENT
Start: 2024-11-04 | End: 2024-11-04

## 2024-11-04 RX ORDER — ACETAMINOPHEN 500 MG
1000 TABLET ORAL ONCE
Refills: 0 | Status: COMPLETED | OUTPATIENT
Start: 2024-11-04 | End: 2024-11-04

## 2024-11-04 RX ORDER — SENNA 187 MG
2 TABLET ORAL AT BEDTIME
Refills: 0 | Status: DISCONTINUED | OUTPATIENT
Start: 2024-11-04 | End: 2024-11-04

## 2024-11-04 RX ORDER — CELECOXIB 100 MG
200 CAPSULE ORAL ONCE
Refills: 0 | Status: COMPLETED | OUTPATIENT
Start: 2024-11-04 | End: 2024-11-04

## 2024-11-04 RX ORDER — ONDANSETRON HYDROCHLORIDE 2 MG/ML
1 INJECTION, SOLUTION INTRAMUSCULAR; INTRAVENOUS
Qty: 20 | Refills: 0
Start: 2024-11-04

## 2024-11-04 RX ORDER — OXYCODONE HYDROCHLORIDE 30 MG/1
1 TABLET ORAL
Qty: 20 | Refills: 0
Start: 2024-11-04 | End: 2024-11-08

## 2024-11-04 RX ORDER — TADALAFIL 10 MG/1
5 TABLET, FILM COATED ORAL AT BEDTIME
Refills: 0 | Status: DISCONTINUED | OUTPATIENT
Start: 2024-11-04 | End: 2024-11-04

## 2024-11-04 RX ORDER — MAGNESIUM HYDROXIDE 1200 MG/15ML
30 SUSPENSION ORAL DAILY
Refills: 0 | Status: DISCONTINUED | OUTPATIENT
Start: 2024-11-04 | End: 2024-11-04

## 2024-11-04 RX ORDER — ACETAMINOPHEN 500 MG
1000 TABLET ORAL ONCE
Refills: 0 | Status: DISCONTINUED | OUTPATIENT
Start: 2024-11-05 | End: 2024-11-04

## 2024-11-04 RX ORDER — ONDANSETRON HYDROCHLORIDE 2 MG/ML
4 INJECTION, SOLUTION INTRAMUSCULAR; INTRAVENOUS ONCE
Refills: 0 | Status: DISCONTINUED | OUTPATIENT
Start: 2024-11-04 | End: 2024-11-04

## 2024-11-04 RX ORDER — ACETAMINOPHEN 500 MG
650 TABLET ORAL ONCE
Refills: 0 | Status: COMPLETED | OUTPATIENT
Start: 2024-11-04 | End: 2024-11-04

## 2024-11-04 RX ORDER — ACETAMINOPHEN 500 MG
1000 TABLET ORAL ONCE
Refills: 0 | Status: DISCONTINUED | OUTPATIENT
Start: 2024-11-04 | End: 2024-11-04

## 2024-11-04 RX ORDER — NALOXONE HYDROCHLORIDE 1 MG/ML
1 INJECTION, SOLUTION INTRAMUSCULAR; INTRAVENOUS; SUBCUTANEOUS
Qty: 1 | Refills: 0
Start: 2024-11-04

## 2024-11-04 RX ORDER — POLYETHYLENE GLYCOL 3350 17 G/17G
17 POWDER, FOR SOLUTION ORAL AT BEDTIME
Refills: 0 | Status: DISCONTINUED | OUTPATIENT
Start: 2024-11-04 | End: 2024-11-04

## 2024-11-04 RX ORDER — SODIUM CHLORIDE 9 MG/ML
1000 INJECTION, SOLUTION INTRAMUSCULAR; INTRAVENOUS; SUBCUTANEOUS
Refills: 0 | Status: DISCONTINUED | OUTPATIENT
Start: 2024-11-04 | End: 2024-11-04

## 2024-11-04 RX ORDER — ACETAMINOPHEN 500 MG
1000 TABLET ORAL EVERY 8 HOURS
Refills: 0 | Status: DISCONTINUED | OUTPATIENT
Start: 2024-11-05 | End: 2024-11-04

## 2024-11-04 RX ORDER — FAMOTIDINE 10 MG/ML
20 INJECTION INTRAVENOUS EVERY 12 HOURS
Refills: 0 | Status: DISCONTINUED | OUTPATIENT
Start: 2024-11-04 | End: 2024-11-04

## 2024-11-04 RX ORDER — OXYCODONE HYDROCHLORIDE 30 MG/1
10 TABLET ORAL
Refills: 0 | Status: DISCONTINUED | OUTPATIENT
Start: 2024-11-04 | End: 2024-11-04

## 2024-11-04 RX ORDER — CEFAZOLIN SODIUM 1 G
2000 VIAL (EA) INJECTION EVERY 8 HOURS
Refills: 0 | Status: COMPLETED | OUTPATIENT
Start: 2024-11-04 | End: 2024-11-04

## 2024-11-04 RX ADMIN — Medication 1 TABLET(S): at 11:54

## 2024-11-04 RX ADMIN — OXYCODONE HYDROCHLORIDE 5 MILLIGRAM(S): 30 TABLET ORAL at 14:45

## 2024-11-04 RX ADMIN — Medication 100 MILLILITER(S): at 10:20

## 2024-11-04 RX ADMIN — SODIUM CHLORIDE 150 MILLILITER(S): 9 INJECTION, SOLUTION INTRAMUSCULAR; INTRAVENOUS; SUBCUTANEOUS at 11:18

## 2024-11-04 RX ADMIN — Medication 100 MILLIGRAM(S): at 16:07

## 2024-11-04 RX ADMIN — OXYCODONE HYDROCHLORIDE 5 MILLIGRAM(S): 30 TABLET ORAL at 13:46

## 2024-11-04 RX ADMIN — Medication 400 MILLIGRAM(S): at 15:32

## 2024-11-04 RX ADMIN — Medication 650 MILLIGRAM(S): at 07:08

## 2024-11-04 RX ADMIN — Medication 200 MILLIGRAM(S): at 07:08

## 2024-11-04 RX ADMIN — Medication 1000 MILLIGRAM(S): at 16:30

## 2024-11-04 NOTE — DISCHARGE NOTE PROVIDER - NSDCQMSTROKE_NEU_ALL_CORE
72-year-old female with DMT2, ESRD on HD, pAF, CAD s/p PCI and HFrEF is admitted as a transfer for acute HFrEF in setting of CKD5 and recent PEA arrest during dialysis. Recent PD catheter placement on 6/4 but concerned for a possibly leak from cath. Unable to initiate dialysis for 2 weeks PTA to OSH due to insurance issues. Hospital course complicated by infected tunneled cath removed on 6/30. Transferred here for higher level of care. Initial plan at OSH was to replace tunneled catheter on the left side due to concerns to that right side may not by suitable. Peritoneal fluid studies negative for peritonitis. PD was initiated here in hospital however we were unable to remove significant amounts of fluid, palliative team was consulted. At this time the daughter wishes to proceed with HD.   Last HD done last night, clotting issue with HD, Cath flow,TPA, was placed for 4 hours in both ports. The patient had a dialysis session afterwards with good blood flow of 400ml/min.   Plan:  - Tunnel cath placed 7/11  - HD today - d/w family this am, they have agreed to proceed  - Trend RFP; replete electrolytes PRN for goal K > 4, Phos > 3, Mg > 2  - Strict I&Os  - Avoid nephrotoxic agents  - Renally adjust medications   No

## 2024-11-04 NOTE — DISCHARGE NOTE NURSING/CASE MANAGEMENT/SOCIAL WORK - FINANCIAL ASSISTANCE
[facility] provides services at a reduced cost to those who are determined to be eligible through [facility]’s financial assistance program. Information regarding [facility]’s financial assistance program can be found by going to [email or web address] or by calling [phone].

## 2024-11-04 NOTE — PHYSICAL THERAPY INITIAL EVALUATION ADULT - RANGE OF MOTION, PT EVAL
on unit/locker # Pt will increase ROM to L Knee in all planes to WFLS for ambulation, transfers, ADLs x4 weeks.

## 2024-11-04 NOTE — PHYSICAL THERAPY INITIAL EVALUATION ADULT - GAIT DEVIATIONS NOTED, PT EVAL
decreased dimitris/increased time in double stance/decreased velocity of limb motion/decreased step length/decreased stride length/decreased weight-shifting ability

## 2024-11-04 NOTE — DISCHARGE NOTE PROVIDER - CARE PROVIDERS DIRECT ADDRESSES
[email or web address]

## 2024-11-04 NOTE — DISCHARGE NOTE PROVIDER - CARE PROVIDER_API CALL
Jaime Newby  Orthopaedic Sports Medicine  [address] [place] [address], [address] [address]  Phone: [phone]  Fax: [phone]  Follow Up Time:

## 2024-11-04 NOTE — PHYSICAL THERAPY INITIAL EVALUATION ADULT - RANGE OF MOTION EXAMINATION, REHAB EVAL
L Knee decreased ROM secondary to post surgical pain and inflammation./bilateral upper extremity ROM was WFL (within functional limits)/bilateral lower extremity ROM was WFL (within functional limits)/deficits as listed below

## 2024-11-04 NOTE — PHYSICAL THERAPY INITIAL EVALUATION ADULT - MANUAL MUSCLE TESTING RESULTS, REHAB EVAL
L Knee decreased strength secondary to post surgical pain and inflammation.  3+/5/grossly assessed due to

## 2024-11-04 NOTE — OCCUPATIONAL THERAPY INITIAL EVALUATION ADULT - PHYSICAL ASSIST/NONPHYSICAL ASSIST: STAND/SIT, REHAB EVAL
-- DO NOT REPLY / DO NOT REPLY ALL --  -- Message is from Engagement Center Operations (ECO) --    Offered Waitlist if Available for the Visit Type? No    Caller is requesting an appointment - at a sooner time than what was available.      Caller wants sooner appointment - offered other approved options    Reason for Visit: TCM discharged on 10/11/2023     Is the patient currently scheduled? No    Preferred time to be seen: Anytime, only wants to see Dr. Urbina     Caller Information       Type Contact Phone/Fax    10/12/2023 10:02 AM CDT Phone (Incoming) CHERYL RODRIGUEZ (Emergency Contact) [phone]          Alternative phone number: No     Can a detailed message be left? Yes    Message Turnaround:     [place]:    Please give this turnaround time to the caller:   \"This message will be sent to [state Provider's name]. The clinical team will fulfill your request as soon as they review your message.\"  
Next Appt:   With Family Practice (Aurelio Urbina DO)  10/12/2023 at 2:15 PM  
verbal cues

## 2024-11-04 NOTE — PROGRESS NOTE ADULT - SUBJECTIVE AND OBJECTIVE BOX
Ortho Progress Note         MEL BOWMAN is a 64yMale who is S/p L TKA (11/4/24).  Patient reports feeling well, pain is well controlled and denies CP/SOB, Dizziness, N/V.  Denies is any new numbness or weakness.  Has no new complaints.     T(C): 36.7 (11-04-24 @ 13:45), Max: 36.9 (11-04-24 @ 07:01)  HR: 67 (11-04-24 @ 13:45) (48 - 67)  BP: 157/80 (11-04-24 @ 13:45) (92/70 - 159/82)  RR: 18 (11-04-24 @ 13:45) (10 - 18)  SpO2: 98% (11-04-24 @ 13:45) (95% - 100%)    LABS:                          14.7   6.38  )-----------( 115      ( 04 Nov 2024 09:51 )             41.4       11-04    141  |  110[H]  |  16  ----------------------------<  82  4.7   |  26  |  0.97    Ca    8.3[L]      04 Nov 2024 09:51        General Appearance:  Patient is examined at bedside.  Appears to have pain well-controlled.  In no apparent distress.      L Knee was Examined.      Dressing is C/D/I  EHL/FHL/GS/AT are intact.   SILT L2-S1  Calves are Soft,  Compartments easily compressible, NTTP Bilaterally.   DP/PT Pulses are palpable, 2/2 Bilaterally     MEL BOWMAN is a 64yMale S/p L TKA (11/4/24)    DVT Proph: A81  Continue with PT/OT  Incentive Spirometry  May decide to go home today and has voided.  Pain Control  LLE WBAT  D/W Dr Newby, Agrees with the plan.   Dispo:  Home.

## 2024-11-04 NOTE — BRIEF OPERATIVE NOTE - NSICDXBRIEFPOSTOP_GEN_ALL_CORE_FT
POST-OP DIAGNOSIS:  Osteoarthritis of left knee 04-Nov-2024 09:40:53  Gregory Collazo  
12-Apr-2023 20:00

## 2024-11-04 NOTE — OCCUPATIONAL THERAPY INITIAL EVALUATION ADULT - LEVEL OF INDEPENDENCE: STAND/SIT, REHAB EVAL
Rituxan Pregnancy And Lactation Text: This medication is Pregnancy Category C and it isn't know if it is safe during pregnancy. It is unknown if this medication is excreted in breast milk but similar antibodies are known to be excreted. supervision

## 2024-11-04 NOTE — DISCHARGE NOTE PROVIDER - NSDCFUSCHEDAPPT_GEN_ALL_CORE_FT
Jaime Newby  [facility] Physician [facility]  ORTHOSURG [address]  Scheduled Appointment: 11/19/2024

## 2024-11-04 NOTE — PHYSICAL THERAPY INITIAL EVALUATION ADULT - GENERAL OBSERVATIONS, REHAB EVAL
Pt encountered seated in chair w/ NAD, AxOx4, dressing to L Knee C/D/I; reports 4/10 pain w/ and w/o activity.

## 2024-11-04 NOTE — OCCUPATIONAL THERAPY INITIAL EVALUATION ADULT - ADDITIONAL COMMENTS
Pre op assessment - Pt lives with girlfriend (Who can assist post op) in a private house with 7 steps with no handrails to enter. Once inside, the pt has 5 steps with a R handrail to reach the main floor where the bedroom and bathroom is. The pt bathroom has a walk in shower stall, fixed shower head, standard toilet seat and no grab bars.

## 2024-11-04 NOTE — DISCHARGE NOTE PROVIDER - HOSPITAL COURSE
The patient is a 64y Male status post elective total knee Arthroplasty after failing outpatient nonoperative conservative management. Patient presented to [facility] after being medically cleared for an elective surgical procedure. The patient was taken to the operating room on date mentioned above. Prophylactic antibiotics were started before the procedure and continued for 24 hours. There were no complications during the procedure and patient tolerated the procedure well. The patient was transferred to the recovery room in stable condition and subsequently to the surgical floor. The patient was placed on Aspirin 81 BID for anticoagulation while in house. All home medications were continued. The patient received physical therapy daily and daily labs were followed. The dressing was kept clean, dry, intact. The rest of the hospital stay was unremarkable.
[place]

## 2024-11-04 NOTE — DISCHARGE NOTE PROVIDER - NSDCMRMEDTOKEN_GEN_ALL_CORE_FT
Pepcid AC 10 mg oral tablet: 1 tab(s) orally  tadalafil 5 mg oral tablet: 1 tab(s) orally   aspirin 81 mg oral capsule: 1 cap(s) orally 2 times a day x 30 days  Colace 100 mg oral capsule: 1 cap(s) orally 2 times a day  Narcan 4 mg/0.1 mL nasal spray: 1 spray(s) intranasally once for narcotic overdose if needed  ondansetron 4 mg oral tablet: 1 tab(s) orally every 6 hours as needed for  nausea  oxyCODONE 5 mg oral tablet: 1 tab(s) orally every 6 hours as needed for  moderate pain MDD: 4  Pepcid AC 10 mg oral tablet: 1 tab(s) orally once a day  tadalafil 5 mg oral tablet: 1 tab(s) orally once a day

## 2024-11-04 NOTE — DISCHARGE NOTE NURSING/CASE MANAGEMENT/SOCIAL WORK - PATIENT PORTAL LINK FT
You can access the FollowMyHealth Patient Portal offered by [facility] by registering at the following website: [email or web address]. By joining [employer]’s FollowMyHealth portal, you will also be able to view your health information using other applications (apps) compatible with our system.

## 2024-11-04 NOTE — DISCHARGE NOTE PROVIDER - NSDCFUADDINST_GEN_ALL_CORE_FT
Discharge Instructions for Total Knee Arthroplasty    1.  Diet: Resume previous diet  2. Activity: WBAT, Rolling walker, Daily PT. Gentle ROM 0-full as tolerated. Walk plenty.   3. Call with: fever over 101, wound redness, drainage or open area, calf pain/calf swelling  4. Wound Care: Remove Aquacel dressing after 10 days  5. OK to Shower with Aquacel. Avoid direct water beating on bandage.  Continue ICE packs to knee.  6. DVT PE Prophylaxis for 30 days after surgery. See med rec for further instructions.  7. Follow Up in office in 2 weeks. Call to schedule appointment.  8. Pain medications:  If going home, eRX sent to your pharmacy for .  Discharge Instructions for Total Knee Arthroplasty    1.  Diet: Resume previous diet  2. Activity: WBAT, Rolling walker, Daily PT. Gentle ROM 0-full as tolerated. Walk plenty.   3. Call with: fever over 101, wound redness, drainage or open area, calf pain/calf swelling  4. Wound Care: Remove Aquacel dressing after 10 days  5. OK to Shower with Aquacel. Avoid direct water beating on bandage.  Continue ICE packs to knee.  6. DVT PE Prophylaxis for 30 days after surgery. See med rec for further instructions.  7. Follow Up in office in 2 weeks. Call to schedule appointment.  8. Pain medications:  If going home, eRX sent to your pharmacy for .     May alternate between tylenol 1000mg every 8 hours and ibuprofen 800mg every 8 hours.

## 2024-11-04 NOTE — DISCHARGE NOTE NURSING/CASE MANAGEMENT/SOCIAL WORK - NSDCPEFALRISK_GEN_ALL_CORE
Patient picked up letter.   For information on Fall & Injury Prevention, visit: [email or web address] OR  [email or web address] OR  https://www.cdc.gov/steadi/patient.html

## 2024-11-10 DIAGNOSIS — M17.12 UNILATERAL PRIMARY OSTEOARTHRITIS, LEFT KNEE: ICD-10-CM

## 2024-11-19 ENCOUNTER — APPOINTMENT (OUTPATIENT)
Dept: ORTHOPEDIC SURGERY | Facility: CLINIC | Age: 64
End: 2024-11-19
Payer: MEDICAID

## 2024-11-19 VITALS — WEIGHT: 172 LBS | HEIGHT: 68 IN | BODY MASS INDEX: 26.07 KG/M2

## 2024-11-19 DIAGNOSIS — M17.0 BILATERAL PRIMARY OSTEOARTHRITIS OF KNEE: ICD-10-CM

## 2024-11-19 PROCEDURE — 99024 POSTOP FOLLOW-UP VISIT: CPT

## 2024-11-22 ENCOUNTER — NON-APPOINTMENT (OUTPATIENT)
Age: 64
End: 2024-11-22

## 2024-11-29 ENCOUNTER — NON-APPOINTMENT (OUTPATIENT)
Age: 64
End: 2024-11-29

## 2024-11-29 PROBLEM — Z87.438 PERSONAL HISTORY OF OTHER DISEASES OF MALE GENITAL ORGANS: Chronic | Status: ACTIVE | Noted: 2024-10-15

## 2024-11-29 RX ORDER — CEPHALEXIN 500 MG/1
500 TABLET ORAL EVERY 6 HOURS
Qty: 40 | Refills: 0 | Status: ACTIVE | COMMUNITY
Start: 2024-11-29 | End: 1900-01-01

## 2024-12-03 ENCOUNTER — APPOINTMENT (OUTPATIENT)
Dept: ORTHOPEDIC SURGERY | Facility: CLINIC | Age: 64
End: 2024-12-03
Payer: MEDICAID

## 2024-12-03 ENCOUNTER — LABORATORY RESULT (OUTPATIENT)
Age: 64
End: 2024-12-03

## 2024-12-03 VITALS — HEIGHT: 68 IN | WEIGHT: 172 LBS | BODY MASS INDEX: 26.07 KG/M2

## 2024-12-03 DIAGNOSIS — Z96.652 PRESENCE OF LEFT ARTIFICIAL KNEE JOINT: ICD-10-CM

## 2024-12-03 DIAGNOSIS — M17.0 BILATERAL PRIMARY OSTEOARTHRITIS OF KNEE: ICD-10-CM

## 2024-12-03 PROCEDURE — 99024 POSTOP FOLLOW-UP VISIT: CPT

## 2024-12-03 PROCEDURE — 73562 X-RAY EXAM OF KNEE 3: CPT | Mod: LT

## 2024-12-03 PROCEDURE — 20610 DRAIN/INJ JOINT/BURSA W/O US: CPT | Mod: 58,LT

## 2024-12-04 ENCOUNTER — NON-APPOINTMENT (OUTPATIENT)
Age: 64
End: 2024-12-04

## 2024-12-04 ENCOUNTER — INPATIENT (INPATIENT)
Facility: HOSPITAL | Age: 64
LOS: 6 days | Discharge: HOME HEALTH | End: 2024-12-11
Attending: ORTHOPAEDIC SURGERY | Admitting: ORTHOPAEDIC SURGERY
Payer: MEDICAID

## 2024-12-04 VITALS
HEART RATE: 82 BPM | HEIGHT: 68 IN | RESPIRATION RATE: 20 BRPM | OXYGEN SATURATION: 95 % | DIASTOLIC BLOOD PRESSURE: 101 MMHG | TEMPERATURE: 99 F | SYSTOLIC BLOOD PRESSURE: 173 MMHG | WEIGHT: 169.98 LBS

## 2024-12-04 DIAGNOSIS — Z98.890 OTHER SPECIFIED POSTPROCEDURAL STATES: Chronic | ICD-10-CM

## 2024-12-04 LAB
ANION GAP SERPL CALC-SCNC: 4 MMOL/L — LOW (ref 5–17)
APTT BLD: 30.1 SEC — SIGNIFICANT CHANGE UP (ref 24.5–35.6)
B PERT IGG+IGM PNL SER: ABNORMAL
BUN SERPL-MCNC: 27 MG/DL — HIGH (ref 7–23)
CALCIUM SERPL-MCNC: 9 MG/DL — SIGNIFICANT CHANGE UP (ref 8.5–10.1)
CHLORIDE SERPL-SCNC: 110 MMOL/L — HIGH (ref 96–108)
CO2 SERPL-SCNC: 26 MMOL/L — SIGNIFICANT CHANGE UP (ref 22–31)
COLOR FLD: NORMAL
CREAT SERPL-MCNC: 0.83 MG/DL — SIGNIFICANT CHANGE UP (ref 0.5–1.3)
EGFR: 98 ML/MIN/1.73M2 — SIGNIFICANT CHANGE UP
EOSINOPHIL # FLD MANUAL: 0 %
FLUID INTAKE SUBSTANCE CLASS: NORMAL
GLUCOSE SERPL-MCNC: 108 MG/DL — HIGH (ref 70–99)
HCT VFR BLD CALC: 37 % — LOW (ref 39–50)
HGB BLD-MCNC: 12.6 G/DL — LOW (ref 13–17)
INR BLD: 1.1 RATIO — SIGNIFICANT CHANGE UP (ref 0.85–1.16)
LYMPHOCYTES # FLD MANUAL: 3 %
MCHC RBC-ENTMCNC: 28.6 PG — SIGNIFICANT CHANGE UP (ref 27–34)
MCHC RBC-ENTMCNC: 34.1 G/DL — SIGNIFICANT CHANGE UP (ref 32–36)
MCV RBC AUTO: 84.1 FL — SIGNIFICANT CHANGE UP (ref 80–100)
MESOTHL CELL NFR FLD: 0 %
MONOS+MACROS NFR FLD MANUAL: 2 %
NEUTS SEG # FLD MANUAL: 95 %
NRBC # BLD: 0 /100 WBCS — SIGNIFICANT CHANGE UP (ref 0–0)
NRBC # FLD: 0 %
PLATELET # BLD AUTO: 183 K/UL — SIGNIFICANT CHANGE UP (ref 150–400)
POTASSIUM SERPL-MCNC: 4.4 MMOL/L — SIGNIFICANT CHANGE UP (ref 3.5–5.3)
POTASSIUM SERPL-SCNC: 4.4 MMOL/L — SIGNIFICANT CHANGE UP (ref 3.5–5.3)
PROTHROM AB SERPL-ACNC: 12.4 SEC — SIGNIFICANT CHANGE UP (ref 9.9–13.4)
RBC # BLD: 4.4 M/UL — SIGNIFICANT CHANGE UP (ref 4.2–5.8)
RBC # FLD MANUAL: NORMAL CELLS/UL
RBC # FLD: 13.2 % — SIGNIFICANT CHANGE UP (ref 10.3–14.5)
SODIUM SERPL-SCNC: 140 MMOL/L — SIGNIFICANT CHANGE UP (ref 135–145)
TOTAL CELLS COUNTED FLD: NORMAL CELLS/UL
TUBE TYPE: NORMAL
UNIDENT CELLS NFR FLD MANUAL: 0 %
VARIANT LYMPHS # FLD MANUAL: 0 %
WBC # BLD: 6.64 K/UL — SIGNIFICANT CHANGE UP (ref 3.8–10.5)
WBC # FLD AUTO: 6.64 K/UL — SIGNIFICANT CHANGE UP (ref 3.8–10.5)
WBC COUNT: NORMAL CELLS/UL

## 2024-12-04 PROCEDURE — 73560 X-RAY EXAM OF KNEE 1 OR 2: CPT | Mod: 26,LT

## 2024-12-04 PROCEDURE — 99285 EMERGENCY DEPT VISIT HI MDM: CPT

## 2024-12-04 PROCEDURE — 71045 X-RAY EXAM CHEST 1 VIEW: CPT | Mod: 26

## 2024-12-04 RX ORDER — OXYCODONE HYDROCHLORIDE 30 MG/1
10 TABLET ORAL
Refills: 0 | Status: DISCONTINUED | OUTPATIENT
Start: 2024-12-04 | End: 2024-12-05

## 2024-12-04 RX ORDER — SENNOSIDES 8.6 MG
2 TABLET ORAL AT BEDTIME
Refills: 0 | Status: DISCONTINUED | OUTPATIENT
Start: 2024-12-04 | End: 2024-12-05

## 2024-12-04 RX ORDER — ONDANSETRON HYDROCHLORIDE 4 MG/1
4 TABLET, FILM COATED ORAL EVERY 6 HOURS
Refills: 0 | Status: DISCONTINUED | OUTPATIENT
Start: 2024-12-04 | End: 2024-12-05

## 2024-12-04 RX ORDER — OXYCODONE HYDROCHLORIDE 30 MG/1
5 TABLET ORAL
Refills: 0 | Status: DISCONTINUED | OUTPATIENT
Start: 2024-12-04 | End: 2024-12-05

## 2024-12-04 RX ORDER — TRAMADOL HYDROCHLORIDE 300 MG/1
50 CAPSULE ORAL EVERY 6 HOURS
Refills: 0 | Status: DISCONTINUED | OUTPATIENT
Start: 2024-12-04 | End: 2024-12-05

## 2024-12-04 RX ORDER — POLYETHYLENE GLYCOL 3350 17 G/17G
17 POWDER, FOR SOLUTION ORAL AT BEDTIME
Refills: 0 | Status: DISCONTINUED | OUTPATIENT
Start: 2024-12-04 | End: 2024-12-05

## 2024-12-04 RX ORDER — IBUPROFEN 200 MG
600 TABLET ORAL ONCE
Refills: 0 | Status: COMPLETED | OUTPATIENT
Start: 2024-12-04 | End: 2024-12-04

## 2024-12-04 RX ORDER — ACETAMINOPHEN 500MG 500 MG/1
650 TABLET, COATED ORAL EVERY 6 HOURS
Refills: 0 | Status: DISCONTINUED | OUTPATIENT
Start: 2024-12-04 | End: 2024-12-05

## 2024-12-04 RX ORDER — CHLORHEXIDINE GLUCONATE 1.2 MG/ML
1 RINSE ORAL ONCE
Refills: 0 | Status: DISCONTINUED | OUTPATIENT
Start: 2024-12-04 | End: 2024-12-05

## 2024-12-04 RX ORDER — TRANEXAMIC ACID 100 MG/ML
1000 INJECTION INTRAVENOUS ONCE
Refills: 0 | Status: DISCONTINUED | OUTPATIENT
Start: 2024-12-05 | End: 2024-12-11

## 2024-12-04 RX ORDER — SODIUM CHLORIDE 9 MG/ML
1000 INJECTION, SOLUTION INTRAMUSCULAR; INTRAVENOUS; SUBCUTANEOUS
Refills: 0 | Status: DISCONTINUED | OUTPATIENT
Start: 2024-12-04 | End: 2024-12-05

## 2024-12-04 RX ORDER — IBUPROFEN 200 MG
600 TABLET ORAL ONCE
Refills: 0 | Status: DISCONTINUED | OUTPATIENT
Start: 2024-12-04 | End: 2024-12-05

## 2024-12-04 RX ADMIN — POLYETHYLENE GLYCOL 3350 17 GRAM(S): 17 POWDER, FOR SOLUTION ORAL at 23:21

## 2024-12-04 RX ADMIN — Medication 600 MILLIGRAM(S): at 22:58

## 2024-12-04 RX ADMIN — Medication 600 MILLIGRAM(S): at 21:58

## 2024-12-04 NOTE — ED ADULT NURSE REASSESSMENT NOTE - NS ED NURSE REASSESS COMMENT FT1
Patient refusing IV insertion and IVF at this time, states he would prefer to have it in the AM. MD Mojica aware. IV and IVF to be started tomorrow in AM.

## 2024-12-04 NOTE — ED ADULT TRIAGE NOTE - CHIEF COMPLAINT QUOTE
Pt c/o left knee infection pt had left knee replacement Nov 4th. Pmhx HTN. Pt on keflex started Friday. and asa 821mg. Allergy Amox and azithro

## 2024-12-04 NOTE — ED PROVIDER NOTE - OBJECTIVE STATEMENT
Pertinent PMH/PSH/FHx/SHx and Review of Systems contained within:  Patient presents to the ED for post op complication.  Patient is s/p left knee TKA with Dr. Jaime Newby on 11/4, now recalled to ER for recent fevers and +joint fluid cultures.  Patient to be taken to OR for washout.     Relevant PMHx/SHx/SOCHx/FAMH:  Osteoarthritis  Patient denies EtOH/tobacco/illicit substance use.    ROS: No fever/chills, No headache/photophobia/eye pain/changes in vision, No ear pain/sore throat/dysphagia, No chest pain/palpitations, no SOB/cough/wheeze/stridor, No abdominal pain, No N/V/D/melena, no dysuria/frequency/discharge, No neck/back pain, no rash, no changes in neurological status/function.

## 2024-12-04 NOTE — ED ADULT NURSE NOTE - OBJECTIVE STATEMENT
Pt c/o left knee infection s/p left knee replacement Nov 4th. Pmhx HTN. Pt on keflex started Friday. and asa 821mg.

## 2024-12-04 NOTE — H&P ADULT - HISTORY OF PRESENT ILLNESS
HPI  64yMale w/ c/o L knee incision drainage s/p L TKA on 11/4/24. Able to bear weight in the LLE since sxs began. Denies fevers/chills. Denies numbness/tingling in the LLE. Denies any recent trauma/injuries/sugery/injections. Denies hx of crystal arthopathy or other inflammatory joint disorders, IV drug use, new sexual encounters/STIs, or other infections. At baseline, community ambulator w/o assistive devices.  He was seen by Dr Newby in office as aspirated and the aspirate revealed about 50k WBC.     ROS  Negative unless otherwise specified in HPI.    PAST MEDICAL & SURGICAL Hx  PAST MEDICAL & SURGICAL HISTORY:  History of BPH      H/O rotator cuff surgery  bilateral      S/P ACL reconstruction  left      H/O hemorrhoidectomy          MEDICATIONS  Home Medications:  Pepcid AC 10 mg oral tablet: 1 tab(s) orally once a day (04 Nov 2024 15:24)  tadalafil 5 mg oral tablet: 1 tab(s) orally once a day (04 Nov 2024 15:24)      ALLERGIES  amoxicillin (Rash)      FAMILY Hx  FAMILY HISTORY:      SOCIAL Hx  Social History:      VITALS  Vital Signs Last 24 Hrs  T(C): --  T(F): --  HR: --  BP: --  BP(mean): --  RR: --  SpO2: --        PHYSICAL EXAM  Gen: Lying in bed, non-toxic appearing, NAD  Resp: No increased WOB  LLE:  Skin intact, +effusion and +drainage from incision over L knee  +TTP over L knee, warmer to touch relative to R side; compartments soft  L knee passive ROM _ deg, limited 2/2 pain  Motor: TA/EHL/GS/FHL intact  Sensory: DP/SP/Tib/Ke/Saph SILT  +DP pulse, WWP    LABS            pending         ASSESSMENT & PLAN  64yMale w/ L knee pain s/p arthrocentesis as an outpt, now here as a send in from Dr Newby.   -WBAT LLE, ACE wrap PRN  -Anesthesia stated no need for additional clearance since last surgery was 30 days ago.   -NPO after MN  -pain control  -ice/cold compress, elevation  .............  -Toradol challenge  -MRI for check for effusion and/or to r/o osteo  -abx  -serial aspirations if unable to tolerate surgery

## 2024-12-04 NOTE — ED PROVIDER NOTE - CLINICAL SUMMARY MEDICAL DECISION MAKING FREE TEXT BOX
Patient called to ER for admission to ortho for septic joint.  VSS.  Ortho is  assuming patient's care from this point and ordering any needed labs, imaging, or medications.  Patient admitted to Dr. Jaime Newby's service.

## 2024-12-04 NOTE — ED PROVIDER NOTE - PHYSICAL EXAMINATION
Gen: Alert, NAD, well appearing  Head: NC, AT, EOMI, normal lids/conjunctiva  ENT: normal hearing, patent oropharynx without erythema/exudate, uvula midline  Neck: +supple, +Trachea midline  Pulm: Bilateral BS, normal resp effort, no wheeze/stridor/retractions  CV: RRR, no M/R/G, +dist pulses  Abd: soft, NT/ND, Negative Hardwick signs, +BS, no palpable masses  Mskel: no edema/erythema/cyanosis, left knee ace wrap  Skin: no rash, warm/dry  Neuro: AAOx3, no apparent sensory/motor deficits, coordination intact

## 2024-12-04 NOTE — ED ADULT NURSE NOTE - NSFALLUNIVINTERV_ED_ALL_ED
Bed/Stretcher in lowest position, wheels locked, appropriate side rails in place/Call bell, personal items and telephone in reach/Instruct patient to call for assistance before getting out of bed/chair/stretcher/Non-slip footwear applied when patient is off stretcher/[place] to call system/Physically safe environment - no spills, clutter or unnecessary equipment/Purposeful proactive rounding/Room/bathroom lighting operational, light cord in reach

## 2024-12-05 ENCOUNTER — TRANSCRIPTION ENCOUNTER (OUTPATIENT)
Age: 64
End: 2024-12-05

## 2024-12-05 LAB
ANION GAP SERPL CALC-SCNC: 5 MMOL/L — SIGNIFICANT CHANGE UP (ref 5–17)
ANION GAP SERPL CALC-SCNC: 5 MMOL/L — SIGNIFICANT CHANGE UP (ref 5–17)
APTT BLD: 30.9 SEC — SIGNIFICANT CHANGE UP (ref 24.5–35.6)
BUN SERPL-MCNC: 20 MG/DL — SIGNIFICANT CHANGE UP (ref 7–23)
BUN SERPL-MCNC: 22 MG/DL — SIGNIFICANT CHANGE UP (ref 7–23)
CALCIUM SERPL-MCNC: 8.1 MG/DL — LOW (ref 8.5–10.1)
CALCIUM SERPL-MCNC: 8.5 MG/DL — SIGNIFICANT CHANGE UP (ref 8.5–10.1)
CHLORIDE SERPL-SCNC: 110 MMOL/L — HIGH (ref 96–108)
CHLORIDE SERPL-SCNC: 110 MMOL/L — HIGH (ref 96–108)
CO2 SERPL-SCNC: 24 MMOL/L — SIGNIFICANT CHANGE UP (ref 22–31)
CO2 SERPL-SCNC: 26 MMOL/L — SIGNIFICANT CHANGE UP (ref 22–31)
CREAT SERPL-MCNC: 0.77 MG/DL — SIGNIFICANT CHANGE UP (ref 0.5–1.3)
CREAT SERPL-MCNC: 0.82 MG/DL — SIGNIFICANT CHANGE UP (ref 0.5–1.3)
CRP SERPL-MCNC: 80 MG/L — HIGH
EGFR: 100 ML/MIN/1.73M2 — SIGNIFICANT CHANGE UP
EGFR: 98 ML/MIN/1.73M2 — SIGNIFICANT CHANGE UP
GLUCOSE SERPL-MCNC: 111 MG/DL — HIGH (ref 70–99)
GLUCOSE SERPL-MCNC: 99 MG/DL — SIGNIFICANT CHANGE UP (ref 70–99)
HCT VFR BLD CALC: 33.2 % — LOW (ref 39–50)
HCT VFR BLD CALC: 33.4 % — LOW (ref 39–50)
HGB BLD-MCNC: 11.5 G/DL — LOW (ref 13–17)
HGB BLD-MCNC: 11.5 G/DL — LOW (ref 13–17)
INR BLD: 1.09 RATIO — SIGNIFICANT CHANGE UP (ref 0.85–1.16)
MCHC RBC-ENTMCNC: 29.1 PG — SIGNIFICANT CHANGE UP (ref 27–34)
MCHC RBC-ENTMCNC: 29.4 PG — SIGNIFICANT CHANGE UP (ref 27–34)
MCHC RBC-ENTMCNC: 34.4 G/DL — SIGNIFICANT CHANGE UP (ref 32–36)
MCHC RBC-ENTMCNC: 34.6 G/DL — SIGNIFICANT CHANGE UP (ref 32–36)
MCV RBC AUTO: 84.6 FL — SIGNIFICANT CHANGE UP (ref 80–100)
MCV RBC AUTO: 84.9 FL — SIGNIFICANT CHANGE UP (ref 80–100)
MRSA PCR RESULT.: SIGNIFICANT CHANGE UP
NRBC # BLD: 0 /100 WBCS — SIGNIFICANT CHANGE UP (ref 0–0)
NRBC # BLD: 0 /100 WBCS — SIGNIFICANT CHANGE UP (ref 0–0)
PLATELET # BLD AUTO: 175 K/UL — SIGNIFICANT CHANGE UP (ref 150–400)
PLATELET # BLD AUTO: 187 K/UL — SIGNIFICANT CHANGE UP (ref 150–400)
POTASSIUM SERPL-MCNC: 3.5 MMOL/L — SIGNIFICANT CHANGE UP (ref 3.5–5.3)
POTASSIUM SERPL-MCNC: 4.1 MMOL/L — SIGNIFICANT CHANGE UP (ref 3.5–5.3)
POTASSIUM SERPL-SCNC: 3.5 MMOL/L — SIGNIFICANT CHANGE UP (ref 3.5–5.3)
POTASSIUM SERPL-SCNC: 4.1 MMOL/L — SIGNIFICANT CHANGE UP (ref 3.5–5.3)
PROTHROM AB SERPL-ACNC: 12.3 SEC — SIGNIFICANT CHANGE UP (ref 9.9–13.4)
RBC # BLD: 3.91 M/UL — LOW (ref 4.2–5.8)
RBC # BLD: 3.95 M/UL — LOW (ref 4.2–5.8)
RBC # FLD: 13 % — SIGNIFICANT CHANGE UP (ref 10.3–14.5)
RBC # FLD: 13.4 % — SIGNIFICANT CHANGE UP (ref 10.3–14.5)
S AUREUS DNA NOSE QL NAA+PROBE: SIGNIFICANT CHANGE UP
SODIUM SERPL-SCNC: 139 MMOL/L — SIGNIFICANT CHANGE UP (ref 135–145)
SODIUM SERPL-SCNC: 141 MMOL/L — SIGNIFICANT CHANGE UP (ref 135–145)
WBC # BLD: 5.38 K/UL — SIGNIFICANT CHANGE UP (ref 3.8–10.5)
WBC # BLD: 9.51 K/UL — SIGNIFICANT CHANGE UP (ref 3.8–10.5)
WBC # FLD AUTO: 5.38 K/UL — SIGNIFICANT CHANGE UP (ref 3.8–10.5)
WBC # FLD AUTO: 9.51 K/UL — SIGNIFICANT CHANGE UP (ref 3.8–10.5)

## 2024-12-05 PROCEDURE — 73560 X-RAY EXAM OF KNEE 1 OR 2: CPT | Mod: 26,LT

## 2024-12-05 PROCEDURE — 99222 1ST HOSP IP/OBS MODERATE 55: CPT

## 2024-12-05 PROCEDURE — 93010 ELECTROCARDIOGRAM REPORT: CPT

## 2024-12-05 PROCEDURE — 27486 REVISE/REPLACE KNEE JOINT: CPT | Mod: LT,78

## 2024-12-05 DEVICE — GRAFT BONE OSTEOBOOST SELECT 10CC STRL: Type: IMPLANTABLE DEVICE | Site: LEFT | Status: FUNCTIONAL

## 2024-12-05 DEVICE — INSERT TIB BEARING PS X3 SZ 4 11MM: Type: IMPLANTABLE DEVICE | Site: LEFT | Status: FUNCTIONAL

## 2024-12-05 RX ORDER — TRAMADOL HYDROCHLORIDE 300 MG/1
50 CAPSULE ORAL EVERY 6 HOURS
Refills: 0 | Status: DISCONTINUED | OUTPATIENT
Start: 2024-12-05 | End: 2024-12-11

## 2024-12-05 RX ORDER — CEFAZOLIN SODIUM 10 G
2000 VIAL (EA) INJECTION EVERY 8 HOURS
Refills: 0 | Status: DISCONTINUED | OUTPATIENT
Start: 2024-12-05 | End: 2024-12-10

## 2024-12-05 RX ORDER — SENNOSIDES 8.6 MG
2 TABLET ORAL AT BEDTIME
Refills: 0 | Status: DISCONTINUED | OUTPATIENT
Start: 2024-12-05 | End: 2024-12-11

## 2024-12-05 RX ORDER — HYDROMORPHONE HYDROCHLORIDE 2 MG/1
0.5 TABLET ORAL
Refills: 0 | Status: DISCONTINUED | OUTPATIENT
Start: 2024-12-05 | End: 2024-12-05

## 2024-12-05 RX ORDER — ACETAMINOPHEN 500MG 500 MG/1
1000 TABLET, COATED ORAL ONCE
Refills: 0 | Status: DISCONTINUED | OUTPATIENT
Start: 2024-12-05 | End: 2024-12-05

## 2024-12-05 RX ORDER — 0.9 % SODIUM CHLORIDE 0.9 %
1000 INTRAVENOUS SOLUTION INTRAVENOUS
Refills: 0 | Status: DISCONTINUED | OUTPATIENT
Start: 2024-12-05 | End: 2024-12-08

## 2024-12-05 RX ORDER — ONDANSETRON HYDROCHLORIDE 4 MG/1
4 TABLET, FILM COATED ORAL EVERY 6 HOURS
Refills: 0 | Status: DISCONTINUED | OUTPATIENT
Start: 2024-12-05 | End: 2024-12-11

## 2024-12-05 RX ORDER — SODIUM CHLORIDE 9 MG/ML
1000 INJECTION, SOLUTION INTRAMUSCULAR; INTRAVENOUS; SUBCUTANEOUS
Refills: 0 | Status: DISCONTINUED | OUTPATIENT
Start: 2024-12-05 | End: 2024-12-08

## 2024-12-05 RX ORDER — FAMOTIDINE 20 MG/1
10 TABLET, FILM COATED ORAL DAILY
Refills: 0 | Status: DISCONTINUED | OUTPATIENT
Start: 2024-12-05 | End: 2024-12-11

## 2024-12-05 RX ORDER — CEFAZOLIN SODIUM 10 G
2000 VIAL (EA) INJECTION EVERY 8 HOURS
Refills: 0 | Status: DISCONTINUED | OUTPATIENT
Start: 2024-12-05 | End: 2024-12-05

## 2024-12-05 RX ORDER — OXYCODONE HYDROCHLORIDE 30 MG/1
5 TABLET ORAL EVERY 4 HOURS
Refills: 0 | Status: DISCONTINUED | OUTPATIENT
Start: 2024-12-05 | End: 2024-12-11

## 2024-12-05 RX ORDER — OXYCODONE HYDROCHLORIDE 30 MG/1
10 TABLET ORAL EVERY 4 HOURS
Refills: 0 | Status: DISCONTINUED | OUTPATIENT
Start: 2024-12-05 | End: 2024-12-11

## 2024-12-05 RX ORDER — ACETAMINOPHEN 500MG 500 MG/1
975 TABLET, COATED ORAL EVERY 8 HOURS
Refills: 0 | Status: DISCONTINUED | OUTPATIENT
Start: 2024-12-06 | End: 2024-12-11

## 2024-12-05 RX ORDER — ACETAMINOPHEN 500MG 500 MG/1
1000 TABLET, COATED ORAL ONCE
Refills: 0 | Status: DISCONTINUED | OUTPATIENT
Start: 2024-12-05 | End: 2024-12-11

## 2024-12-05 RX ORDER — POVIDONE-IODINE 7.5 %
1 SPONGE TOPICAL ONCE
Refills: 0 | Status: DISCONTINUED | OUTPATIENT
Start: 2024-12-05 | End: 2024-12-11

## 2024-12-05 RX ORDER — FENTANYL 12 UG/H
25 PATCH, EXTENDED RELEASE TRANSDERMAL
Refills: 0 | Status: DISCONTINUED | OUTPATIENT
Start: 2024-12-05 | End: 2024-12-05

## 2024-12-05 RX ORDER — POLYETHYLENE GLYCOL 3350 17 G/17G
17 POWDER, FOR SOLUTION ORAL AT BEDTIME
Refills: 0 | Status: DISCONTINUED | OUTPATIENT
Start: 2024-12-05 | End: 2024-12-11

## 2024-12-05 RX ORDER — ACETAMINOPHEN, DIPHENHYDRAMINE HCL, PHENYLEPHRINE HCL 325; 25; 5 MG/1; MG/1; MG/1
3 TABLET ORAL AT BEDTIME
Refills: 0 | Status: DISCONTINUED | OUTPATIENT
Start: 2024-12-05 | End: 2024-12-11

## 2024-12-05 RX ADMIN — TRAMADOL HYDROCHLORIDE 50 MILLIGRAM(S): 300 CAPSULE ORAL at 21:46

## 2024-12-05 RX ADMIN — ACETAMINOPHEN 500MG 650 MILLIGRAM(S): 500 TABLET, COATED ORAL at 05:22

## 2024-12-05 RX ADMIN — Medication 75 MILLILITER(S): at 19:10

## 2024-12-05 RX ADMIN — Medication 100 MILLIGRAM(S): at 21:38

## 2024-12-05 RX ADMIN — POLYETHYLENE GLYCOL 3350 17 GRAM(S): 17 POWDER, FOR SOLUTION ORAL at 21:37

## 2024-12-05 RX ADMIN — OXYCODONE HYDROCHLORIDE 5 MILLIGRAM(S): 30 TABLET ORAL at 23:30

## 2024-12-05 RX ADMIN — Medication 2 TABLET(S): at 21:37

## 2024-12-05 RX ADMIN — TRAMADOL HYDROCHLORIDE 50 MILLIGRAM(S): 300 CAPSULE ORAL at 22:16

## 2024-12-05 RX ADMIN — ACETAMINOPHEN 500MG 650 MILLIGRAM(S): 500 TABLET, COATED ORAL at 13:12

## 2024-12-05 RX ADMIN — SODIUM CHLORIDE 100 MILLILITER(S): 9 INJECTION, SOLUTION INTRAMUSCULAR; INTRAVENOUS; SUBCUTANEOUS at 05:25

## 2024-12-05 RX ADMIN — OXYCODONE HYDROCHLORIDE 5 MILLIGRAM(S): 30 TABLET ORAL at 20:04

## 2024-12-05 RX ADMIN — OXYCODONE HYDROCHLORIDE 5 MILLIGRAM(S): 30 TABLET ORAL at 19:16

## 2024-12-05 NOTE — CONSULT NOTE ADULT - SUBJECTIVE AND OBJECTIVE BOX
[facility]  INFECTIOUS DISEASES   [address] [place] [address]  Tel: [phone]     Fax: [phone]  ==============================================================================  DO Genie Tayolr MD Sehrish Shahid, MD Alexandra Gutman, NP   ==============================================================================    MEL BOWMAN  MRN-[id number]  Male  64y ([birth year])        Patient is a 64y old  Male who presents with a chief complaint of R knee washout (05 Dec 2024 16:23)      HPI:  HPI  64yMale w/ c/o L knee incision drainage s/p L TKA on 11/4/24. Able to bear weight in the LLE since sxs began. Denies fevers/chills. Denies numbness/tingling in the LLE. Denies any recent trauma/injuries/sugery/injections. Denies hx of crystal arthopathy or other inflammatory joint disorders, IV drug use, new sexual encounters/STIs, or other infections. At baseline, community ambulator w/o assistive devices.  He was seen by Dr Newby in office as aspirated and the aspirate revealed about 50k WBC.    (04 Dec 2024 20:07)    high wbc count on joint aspirate with PCR with MSSA (methicillin-susceptible Staphylococcus aureus)   ID consulted for workup and antibiotic management     PAST MEDICAL & SURGICAL HISTORY:  History of BPH      H/O rotator cuff surgery  bilateral      S/P ACL reconstruction  left      H/O hemorrhoidectomy          Allergies  amoxicillin (Rash)  azithromycin (Unknown)        ANTIMICROBIALS:  ceFAZolin   IVPB 2000 every 8 hours      MEDICATIONS  (STANDING):    ceFAZolin   IVPB   100 mL/Hr IV Intermittent (12-05-24 @ 21:38)        OTHER MEDS: MEDICATIONS  (STANDING):  acetaminophen   IVPB .. 1000 once  famotidine    Tablet 10 daily  magnesium hydroxide Suspension 30 daily PRN  melatonin 3 at bedtime  ondansetron Injectable 4 every 6 hours PRN  oxyCODONE    IR 5 every 4 hours PRN  oxyCODONE    IR 10 every 4 hours PRN  polyethylene glycol 3350 17 at bedtime  senna 2 at bedtime  traMADol 50 every 6 hours PRN  tranexamic acid IVPB 1000 once      SOCIAL HISTORY:     Smoking Cigarettes [ ]Active [ ] Former [x ]Denies   ETOH [ x]denies [ ]Former [ ]Current Use denies   Drug Use [x ]Never [ ] Former [ ] Active     FAMILY HISTORY:      REVIEW OF SYSTEMS  [  ] ROS unobtainable because:    [x] All other systems negative except as noted below:	    Constitutional:  [ ] fever [ ] chills  [ ] weight loss  [ ] weakness  Skin:  [ ] rash [ ] phlebitis	  Eyes: [ ] icterus [ ] pain  [ ] discharge	  ENMT: [ ] sore throat  [ ] thrush [ ] ulcers [ ] exudates  Respiratory: [ ] dyspnea [ ] hemoptysis [ ] cough [ ] sputum	  Cardiovascular:  [ ] chest pain [ ] palpitations [ ] edema	  Gastrointestinal:  [ ] nausea [ ] vomiting [ ] diarrhea [ ] constipation [ ] pain	  Genitourinary:  [ ] dysuria [ ] frequency [ ] hematuria [ ] discharge [ ] flank pain  [ ] incontinence  Musculoskeletal:  [ ] myalgias [ ] arthralgias [ ] arthritis  [x ] knee pain  Neurological:  [ ] headache [ ] seizures  [ ] confusion/altered mental status  Psychiatric:  [ ] anxiety [ ] depression	  Hematology/Lymphatics:  [ ] lymphadenopathy  Endocrine:  [ ] adrenal [ ] thyroid  Allergic/Immunologic:	 [ ] transplant [ ] seasonal    Vital Signs Last 24 Hrs  T(F): 98.1 (12-05-24 @ 21:00), Max: 99.2 (12-04-24 @ 20:09)    Vital Signs Last 24 Hrs  HR: 71 (12-05-24 @ 21:00) (58 - 74)  BP: 139/75 (12-05-24 @ 21:00) (118/60 - 159/85)  RR: 18 (12-05-24 @ 21:00)  SpO2: 95% (12-05-24 @ 21:00) (95% - 100%)  Wt(kg): --    PHYSICAL EXAM:  Constitutional: non-toxic, no distress  HEAD/EYES: anicteric, no conjunctival injection  ENT:  supple, no thrush  Cardiovascular:   normal S1, S2, no murmur, no edema  Respiratory:  clear BS bilaterally, no wheezes, no rales  GI:  soft, non-tender, normal bowel sounds  :  no ipckett, no CVA tenderness  Musculoskeletal:  left knee heavily wrapped and in brace with 2 drains  Neurologic: awake and alert, normal strength, no focal findings  Skin:  no rash, no erythema, no phlebitis  Heme/Onc: no lymphadenopathy   Psychiatric:  awake, alert, appropriate mood        WBC  WBC Count: 9.51 K/uL (12-05 @ 16:37)  WBC Count: 5.38 K/uL (12-05 @ 04:40)  WBC Count: 6.64 K/uL (12-04 @ 21:46)        CBC                        11.5   9.51  )-----------( 187      ( 05 Dec 2024 16:37 )             33.4     BMP/CMP  12-05    139  |  110[H]  |  20  ----------------------------<  111[H]  4.1   |  24  |  0.77    Ca    8.1[L]      05 Dec 2024 16:37      Creatinine Trend  Creatinine Trend: 0.77<--, 0.82<--, 0.83<--      MICROBIOLOGY:    RADIOLOGY:  < from: Xray Knee 1 or 2 Views, Left (12.04.24 @ 21:14) >  IMPRESSION:    Total left knee arthroplasty, no acute findings.    Suprapatellar soft tissue swelling      I have personally reviewed the above imaging

## 2024-12-05 NOTE — DISCHARGE NOTE PROVIDER - NSDCCPTREATMENT_GEN_ALL_CORE_FT
PRINCIPAL PROCEDURE  Procedure: Incision and drainage of knee with replacement of polyethylene liner  Findings and Treatment: left

## 2024-12-05 NOTE — BRIEF OPERATIVE NOTE - NSICDXBRIEFPROCEDURE_GEN_ALL_CORE_FT
PROCEDURES:  Incision and drainage of knee with polyethylene liner exchange 05-Dec-2024 16:21:54 Left Khadar Tim, drains x2, cultures x3, Brian Hoang

## 2024-12-05 NOTE — OCCUPATIONAL THERAPY INITIAL EVALUATION ADULT - RANGE OF MOTION EXAMINATION, LOWER EXTREMITY
Addended by: SYLVIA FELDER on: 3/2/2023 03:19 PM     Modules accepted: Level of Service    
Left LE IN KI/Right LE Active ROM was WFL   (within functional limits)

## 2024-12-05 NOTE — DISCHARGE NOTE PROVIDER - NSDCFUADDINST_GEN_ALL_CORE_FT
Discharge Instructions for Knee Arthroplasty    1. Pain medication will be sent electronically to your pharmacy - take these as needed.     2. Follow up with your orthopaedic surgeon in office in 2 weeks. Please call the office number to make an appointment, or if you have any questions. Sutures or staples can be removed at this appointment as long as the wound is healed and there is no drainage or opening of the incision.     3. Call with fevers over 101F; wound redness; drainage or opened incisions; or calf pain/swelling.    4. Resume your previous diet.     5. You may walking with full weight bearing as tolerated; use your assistive devices (walker/cane) as needed. Walk plenty.     6. Follow your daily physical therapy protocol.     7. Keep your dressing clean and dry. Change the dressing every 7 days or if it becomes visibly soiled. To change, cover the incision with gauze and then with Tegaderm or paper tape on top.     8. It is ok to shower with an Aquacel or Mepilex bandage only.  Avoid direct water beating on bandage. Otherwise, if you have any other kind of dressing, do not shower and keep your dressing clean and dry.     9. Do not take a bath/soak/hot tub, and do not submerge your dressing.     10. Continue to place ice packs on the knee.   Discharge Instructions for Knee Arthroplasty    1. Pain medication will be sent electronically to your pharmacy - take these as needed.     2. Follow up with your orthopaedic surgeon in office in 2 weeks. Please call the office number to make an appointment, or if you have any questions. Sutures or staples can be removed at this appointment as long as the wound is healed and there is no drainage or opening of the incision.     3. Call with fevers over 101F; wound redness; drainage or opened incisions; or calf pain/swelling.    4. Resume your previous diet.     5. You may walking with full weight bearing as tolerated; use your assistive devices (walker/cane) as needed. Walk plenty.     6. Follow your daily physical therapy protocol.     7. Keep your dressing clean and dry. Change the dressing every 7 days or if it becomes visibly soiled. To change, cover the incision with gauze and then with Tegaderm or paper tape on top.     8. It is ok to shower with an Aquacel or Mepilex bandage only.  Avoid direct water beating on bandage. Otherwise, if you have any other kind of dressing, do not shower and keep your dressing clean and dry.     9. Do not take a bath/soak/hot tub, and do not submerge your dressing.     10. Continue to place ice packs on the knee.    11. Follow up with Dr. Juares for your BPH in 2 weeks. Call the office to make an appointment. Discharge Instructions for Knee Arthroplasty    1. Pain medication will be sent electronically to your pharmacy - take these as needed.     2. Follow up with your orthopaedic surgeon in office in 2 weeks. Please call the office number to make an appointment, or if you have any questions. Sutures or staples can be removed at this appointment as long as the wound is healed and there is no drainage or opening of the incision.     3. Call with fevers over 101F; wound redness; drainage or opened incisions; or calf pain/swelling.    4. Resume your previous diet.     5. You may walking with full weight bearing as tolerated; use your assistive devices (walker/cane) as needed. Walk plenty.     6. Follow your daily physical therapy protocol.     7. Keep your dressing clean and dry. Change the dressing every 7 days or if it becomes visibly soiled. To change, cover the incision with gauze and then with Tegaderm or paper tape on top.     8. It is ok to shower with an Aquacel or Mepilex bandage only.  Avoid direct water beating on bandage. Otherwise, if you have any other kind of dressing, do not shower and keep your dressing clean and dry.     9. Do not take a bath/soak/hot tub, and do not submerge your dressing.     10. Continue to place ice packs on the knee.    11. Follow up with Dr. Juares for your BPH in 2 weeks. Call the office to make an appointment.    12. Nylons to be taken out at Dr Newby's office on 12/20/24, call office if there is no current appointment setup.

## 2024-12-05 NOTE — DISCHARGE NOTE PROVIDER - PROVIDER TOKENS
PROVIDER:[TOKEN:[[id number]]] PROVIDER:[TOKEN:[[id number]]],PROVIDER:[TOKEN:[[id number]],FOLLOWUP:[2 weeks]]

## 2024-12-05 NOTE — DISCHARGE NOTE PROVIDER - NSDCMRMEDTOKEN_GEN_ALL_CORE_FT
aspirin 81 mg oral capsule: 1 cap(s) orally 2 times a day x 30 days  Colace 100 mg oral capsule: 1 cap(s) orally 2 times a day  Narcan 4 mg/0.1 mL nasal spray: 1 spray(s) intranasally once for narcotic overdose if needed  ondansetron 4 mg oral tablet: 1 tab(s) orally every 6 hours as needed for  nausea  oxyCODONE 5 mg oral tablet: 1 tab(s) orally every 6 hours as needed for  moderate pain MDD: 4  Pepcid AC 10 mg oral tablet: 1 tab(s) orally once a day  tadalafil 5 mg oral tablet: 1 tab(s) orally once a day   Colace 100 mg oral capsule: 1 cap(s) orally 2 times a day  ibuprofen 400 mg oral tablet: 1 tab(s) orally every 8 hours  nafcillin: 12 gram(s) intravenously once a day via pump, per infectious disease recommendations  ondansetron 4 mg oral tablet: 1 tab(s) orally every 6 hours as needed for  nausea  rifAMPin 300 mg oral capsule: 2 cap(s) orally once a day per infectious disease recs  tadalafil 5 mg oral tablet: 1 tab(s) orally once a day  tamsulosin 0.4 mg oral capsule: 2 cap(s) orally once a day (at bedtime)  traMADol 50 mg oral tablet: 1 tab(s) orally every 6 hours as needed for  moderate pain MDD: 4-6 tabs

## 2024-12-05 NOTE — PHYSICAL THERAPY INITIAL EVALUATION ADULT - GENERAL OBSERVATIONS, REHAB EVAL
Pt encountered semi-fowlers in bed w/ NAD, AxOx4, +2 Hemovacs, +COLT, +KI, +surgical dressing C/D/I, +SCD, +IV, +bedalarm; OT Campbell present, and reports 3/10 pain at rest and 5/10 w/ activity.

## 2024-12-05 NOTE — PROGRESS NOTE ADULT - SUBJECTIVE AND OBJECTIVE BOX
Patient seen and examined at bedside. Pain is controlled. Patient denies any numbness, tingling, weakness, or any other orthopaedic complaint.    Exam:  T(C): 37.2 (12-05-24 @ 11:08), Max: 37.3 (12-04-24 @ 20:09)  T(F): 98.9 (12-05-24 @ 11:08), Max: 99.2 (12-04-24 @ 20:09)  HR: 59 (12-05-24 @ 11:08) (58 - 82)  BP: 159/85 (12-05-24 @ 11:08) (129/68 - 173/101)  RR: 18 (12-05-24 @ 11:08) (17 - 20)  SpO2: 98% (12-05-24 @ 11:08) (95% - 98%)    Gen: Resting in bed, no acute distress  LLE  COLT dressing in place, clean/dry/intact. 2 drains sewn in place.   Ching-incisional tenderness to palpation; otherwise, NTTP throughout the rest of the extremity.   SILT L2-S1.  GSC/TA/EHL/FHL intact. Q/H intact.   DP, PT pulse palpable.   No calf tenderness bilaterally.   Compartments soft and compressible.                           11.5   5.38  )-----------( 175      ( 05 Dec 2024 04:40 )             33.2   12-05    141  |  110[H]  |  22  ----------------------------<  99  3.5   |  26  |  0.82    Ca    8.5      05 Dec 2024 04:40        Assessment and Plan:  64y Male POD0 L knee I&D, poly exchange    Follow up postoperative labs  WBAT, knee immobilizer until tomorrow AM  Pain management PRN  Continue prophylactic antibiotics, FU ID consult  DVT PPx: hold until POD1  Incentive spirometry  Dispo: pending recommendations per PT  Will discuss with Dr. Newby and advise of any changes to the plan.

## 2024-12-05 NOTE — PHYSICAL THERAPY INITIAL EVALUATION ADULT - MANUAL MUSCLE TESTING RESULTS, REHAB EVAL
L Knee decreased strength secondary to post surgical pain and inflammation. Unable to assess objective measurement secondary to KI, remaining body grossly 4-/5/grossly assessed due to

## 2024-12-05 NOTE — DISCHARGE NOTE PROVIDER - NSDCFUSCHEDAPPT_GEN_ALL_CORE_FT
Jaime Newby  [facility] Physician [facility]  ORTHOSURG [address]  Scheduled Appointment: 12/17/2024     Jaime Newby  [facility] Physician [facility]  ORTHOSURG [address]  Scheduled Appointment: 12/20/2024

## 2024-12-05 NOTE — DISCHARGE NOTE PROVIDER - CARE PROVIDERS DIRECT ADDRESSES
[email or web address]

## 2024-12-05 NOTE — PHYSICAL THERAPY INITIAL EVALUATION ADULT - RANGE OF MOTION EXAMINATION, REHAB EVAL
LLE unable to assess ROM secondary to KI/bilateral upper extremity ROM was WFL (within functional limits)/Right LE ROM was WFL (within functional limits)/deficits as listed below

## 2024-12-05 NOTE — OCCUPATIONAL THERAPY INITIAL EVALUATION ADULT - GENERAL OBSERVATIONS, REHAB EVAL
Pt encountered semi supine in bed, NAD, all lines intact, pt reported pain 3/10 s/p L knee I&D, poly exchange, pt agreeable to OT eval, PT Dom present.

## 2024-12-05 NOTE — OCCUPATIONAL THERAPY INITIAL EVALUATION ADULT - ADDITIONAL COMMENTS
Pt lives with girlfriend (Who can assist post op) in a private house with 7 steps with no handrails to enter. Once inside, the pt has 5 steps with a R handrail to reach the main floor where the bedroom and bathroom is. The pt bathroom has a walk in shower stall, fixed shower head, standard toilet seat and no grab bars.

## 2024-12-05 NOTE — DISCHARGE NOTE PROVIDER - HOSPITAL COURSE
Hospital Course:  The patient is a 64y Male status post elective left knee irrigation, debridement, and polyswap after failing outpatient nonoperative conservative management. Patient presented to [facility] and was medically cleared for the surgical procedure. The patient was taken to the operating room on date mentioned above. Prophylactic antibiotics were started before the procedure and continued for 24 hours. There were no complications during the procedure and patient tolerated the procedure well. The patient was transferred to the recovery room in stable condition and subsequently to the surgical floor. The patient was placed on medication for DVT prophylaxis. All home medications were continued. The patient received physical therapy daily and daily labs were followed. The dressing was kept clean, dry, intact. The rest of the hospital stay was unremarkable.   Hospital Course:  The patient is a 64y Male status post elective left knee irrigation, debridement, and polyswap after failing outpatient nonoperative conservative management. Patient presented to [facility] and was medically cleared for the surgical procedure. The patient was taken to the operating room on date mentioned above. Prophylactic antibiotics were started before the procedure and continued for 24 hours. There were no complications during the procedure and patient tolerated the procedure well. The patient was transferred to the recovery room in stable condition and subsequently to the surgical floor. The patient was placed on medication for DVT prophylaxis. All home medications were continued. The patient received physical therapy daily and daily labs were followed. The dressing was kept clean, dry, intact. The rest of the hospital stay was unremarkable.  Patient to receive IV Abx per infectious disease recommendations.

## 2024-12-05 NOTE — DISCHARGE NOTE PROVIDER - NSDCCPCAREPLAN_GEN_ALL_CORE_FT
PRINCIPAL DISCHARGE DIAGNOSIS  Diagnosis: Infection of prosthetic knee joint  Assessment and Plan of Treatment:

## 2024-12-05 NOTE — DISCHARGE NOTE PROVIDER - CARE PROVIDER_API CALL
Jaime Newby  Orthopaedic Sports Medicine  [address] [place] [address], [address] [address]  Phone: [phone]  Fax: [phone]  Follow Up Time:    Jaime Newby  Orthopaedic Sports Medicine  [address] [facility], [address]  [place] [address]  Phone: [phone]  Fax: [phone]  Follow Up Time:     Pepe Juares  Urology  [address] [facility]  [place] [address]  Phone: [phone]  Fax: [phone]  Follow Up Time: 2 weeks

## 2024-12-06 LAB
ANION GAP SERPL CALC-SCNC: 4 MMOL/L — LOW (ref 5–17)
BUN SERPL-MCNC: 19 MG/DL — SIGNIFICANT CHANGE UP (ref 7–23)
CALCIUM SERPL-MCNC: 8.3 MG/DL — LOW (ref 8.5–10.1)
CHLORIDE SERPL-SCNC: 106 MMOL/L — SIGNIFICANT CHANGE UP (ref 96–108)
CO2 SERPL-SCNC: 27 MMOL/L — SIGNIFICANT CHANGE UP (ref 22–31)
CREAT SERPL-MCNC: 0.74 MG/DL — SIGNIFICANT CHANGE UP (ref 0.5–1.3)
EGFR: 101 ML/MIN/1.73M2 — SIGNIFICANT CHANGE UP
GLUCOSE SERPL-MCNC: 96 MG/DL — SIGNIFICANT CHANGE UP (ref 70–99)
GRAM STN FLD: SIGNIFICANT CHANGE UP
HCT VFR BLD CALC: 30.6 % — LOW (ref 39–50)
HCV AB S/CO SERPL IA: 0.17 S/CO — SIGNIFICANT CHANGE UP (ref 0–0.99)
HCV AB SERPL-IMP: SIGNIFICANT CHANGE UP
HGB BLD-MCNC: 10.2 G/DL — LOW (ref 13–17)
HIV 1+2 AB+HIV1 P24 AG SERPL QL IA: SIGNIFICANT CHANGE UP
MCHC RBC-ENTMCNC: 28.3 PG — SIGNIFICANT CHANGE UP (ref 27–34)
MCHC RBC-ENTMCNC: 33.3 G/DL — SIGNIFICANT CHANGE UP (ref 32–36)
MCV RBC AUTO: 84.8 FL — SIGNIFICANT CHANGE UP (ref 80–100)
NRBC # BLD: 0 /100 WBCS — SIGNIFICANT CHANGE UP (ref 0–0)
PLATELET # BLD AUTO: 187 K/UL — SIGNIFICANT CHANGE UP (ref 150–400)
POTASSIUM SERPL-MCNC: 4.6 MMOL/L — SIGNIFICANT CHANGE UP (ref 3.5–5.3)
POTASSIUM SERPL-SCNC: 4.6 MMOL/L — SIGNIFICANT CHANGE UP (ref 3.5–5.3)
RBC # BLD: 3.61 M/UL — LOW (ref 4.2–5.8)
RBC # FLD: 13.1 % — SIGNIFICANT CHANGE UP (ref 10.3–14.5)
SODIUM SERPL-SCNC: 137 MMOL/L — SIGNIFICANT CHANGE UP (ref 135–145)
SPECIMEN SOURCE: SIGNIFICANT CHANGE UP
WBC # BLD: 8.69 K/UL — SIGNIFICANT CHANGE UP (ref 3.8–10.5)
WBC # FLD AUTO: 8.69 K/UL — SIGNIFICANT CHANGE UP (ref 3.8–10.5)

## 2024-12-06 PROCEDURE — 99254 IP/OBS CNSLTJ NEW/EST MOD 60: CPT

## 2024-12-06 PROCEDURE — 99232 SBSQ HOSP IP/OBS MODERATE 35: CPT

## 2024-12-06 RX ORDER — TAMSULOSIN HYDROCHLORIDE 0.4 MG/1
0.8 CAPSULE ORAL AT BEDTIME
Refills: 0 | Status: DISCONTINUED | OUTPATIENT
Start: 2024-12-06 | End: 2024-12-11

## 2024-12-06 RX ORDER — TADALAFIL 20 MG/1
5 TABLET ORAL DAILY
Refills: 0 | Status: DISCONTINUED | OUTPATIENT
Start: 2024-12-06 | End: 2024-12-06

## 2024-12-06 RX ORDER — TAMSULOSIN HYDROCHLORIDE 0.4 MG/1
0.4 CAPSULE ORAL AT BEDTIME
Refills: 0 | Status: DISCONTINUED | OUTPATIENT
Start: 2024-12-06 | End: 2024-12-06

## 2024-12-06 RX ORDER — BENZOCAINE, MENTHOL 15; 3.6 MG/1; MG/1
1 LOZENGE ORAL ONCE
Refills: 0 | Status: COMPLETED | OUTPATIENT
Start: 2024-12-06 | End: 2024-12-06

## 2024-12-06 RX ORDER — MAGNESIUM, ALUMINUM HYDROXIDE 200-225/5
30 SUSPENSION, ORAL (FINAL DOSE FORM) ORAL EVERY 6 HOURS
Refills: 0 | Status: DISCONTINUED | OUTPATIENT
Start: 2024-12-06 | End: 2024-12-11

## 2024-12-06 RX ORDER — BENZOCAINE, MENTHOL 15; 3.6 MG/1; MG/1
1 LOZENGE ORAL EVERY 4 HOURS
Refills: 0 | Status: DISCONTINUED | OUTPATIENT
Start: 2024-12-06 | End: 2024-12-11

## 2024-12-06 RX ORDER — DIPHENHYDRAMINE HCL 25 MG
25 CAPSULE ORAL EVERY 4 HOURS
Refills: 0 | Status: DISCONTINUED | OUTPATIENT
Start: 2024-12-06 | End: 2024-12-11

## 2024-12-06 RX ADMIN — TAMSULOSIN HYDROCHLORIDE 0.8 MILLIGRAM(S): 0.4 CAPSULE ORAL at 21:01

## 2024-12-06 RX ADMIN — OXYCODONE HYDROCHLORIDE 5 MILLIGRAM(S): 30 TABLET ORAL at 12:24

## 2024-12-06 RX ADMIN — Medication 100 MILLIGRAM(S): at 05:36

## 2024-12-06 RX ADMIN — ACETAMINOPHEN 500MG 975 MILLIGRAM(S): 500 TABLET, COATED ORAL at 05:31

## 2024-12-06 RX ADMIN — Medication 100 MILLIGRAM(S): at 22:29

## 2024-12-06 RX ADMIN — FAMOTIDINE 10 MILLIGRAM(S): 20 TABLET, FILM COATED ORAL at 11:24

## 2024-12-06 RX ADMIN — Medication 25 MILLIGRAM(S): at 22:26

## 2024-12-06 RX ADMIN — ACETAMINOPHEN 500MG 975 MILLIGRAM(S): 500 TABLET, COATED ORAL at 13:25

## 2024-12-06 RX ADMIN — TRAMADOL HYDROCHLORIDE 50 MILLIGRAM(S): 300 CAPSULE ORAL at 17:43

## 2024-12-06 RX ADMIN — ACETAMINOPHEN 500MG 975 MILLIGRAM(S): 500 TABLET, COATED ORAL at 21:55

## 2024-12-06 RX ADMIN — Medication 81 MILLIGRAM(S): at 17:43

## 2024-12-06 RX ADMIN — OXYCODONE HYDROCHLORIDE 5 MILLIGRAM(S): 30 TABLET ORAL at 00:00

## 2024-12-06 RX ADMIN — Medication 75 MILLILITER(S): at 11:25

## 2024-12-06 RX ADMIN — TRAMADOL HYDROCHLORIDE 50 MILLIGRAM(S): 300 CAPSULE ORAL at 03:38

## 2024-12-06 RX ADMIN — TRAMADOL HYDROCHLORIDE 50 MILLIGRAM(S): 300 CAPSULE ORAL at 18:43

## 2024-12-06 RX ADMIN — ACETAMINOPHEN 500MG 975 MILLIGRAM(S): 500 TABLET, COATED ORAL at 06:01

## 2024-12-06 RX ADMIN — Medication 81 MILLIGRAM(S): at 05:33

## 2024-12-06 RX ADMIN — BENZOCAINE, MENTHOL 1 LOZENGE: 15; 3.6 LOZENGE ORAL at 02:08

## 2024-12-06 RX ADMIN — ACETAMINOPHEN 500MG 975 MILLIGRAM(S): 500 TABLET, COATED ORAL at 14:25

## 2024-12-06 RX ADMIN — ACETAMINOPHEN, DIPHENHYDRAMINE HCL, PHENYLEPHRINE HCL 3 MILLIGRAM(S): 325; 25; 5 TABLET ORAL at 21:01

## 2024-12-06 RX ADMIN — TRAMADOL HYDROCHLORIDE 50 MILLIGRAM(S): 300 CAPSULE ORAL at 03:08

## 2024-12-06 RX ADMIN — TRAMADOL HYDROCHLORIDE 50 MILLIGRAM(S): 300 CAPSULE ORAL at 10:19

## 2024-12-06 RX ADMIN — Medication 2 TABLET(S): at 21:01

## 2024-12-06 RX ADMIN — TRAMADOL HYDROCHLORIDE 50 MILLIGRAM(S): 300 CAPSULE ORAL at 11:15

## 2024-12-06 RX ADMIN — ACETAMINOPHEN 500MG 975 MILLIGRAM(S): 500 TABLET, COATED ORAL at 21:01

## 2024-12-06 RX ADMIN — OXYCODONE HYDROCHLORIDE 5 MILLIGRAM(S): 30 TABLET ORAL at 11:24

## 2024-12-06 RX ADMIN — POLYETHYLENE GLYCOL 3350 17 GRAM(S): 17 POWDER, FOR SOLUTION ORAL at 21:02

## 2024-12-06 RX ADMIN — Medication 100 MILLIGRAM(S): at 13:25

## 2024-12-06 NOTE — CONSULT NOTE ADULT - NSCONSULTADDITIONALINFOA_GEN_ALL_CORE
Attg.    Spoke To ID and pt in need of Rifampin and efficacy is reduced when taking Tadalafil (by up to 40-50%)    Would hold and start Tamsulosin.    Is also to take Finasteride if needed.    Ck PVR: MAY need pickett placed, karlene when not amublating

## 2024-12-06 NOTE — PROGRESS NOTE ADULT - SUBJECTIVE AND OBJECTIVE BOX
Pt seen and examined at bedside, no overnight events, pain controlled. Denies fever chills chest pain SOB new numbness tingling or weakness in the extremities    Vital Signs (24 Hrs):  T(C): 37.1 (12-06-24 @ 05:12), Max: 37.3 (12-05-24 @ 23:30)  HR: 69 (12-06-24 @ 05:12) (59 - 74)  BP: 159/79 (12-06-24 @ 05:12) (118/60 - 159/85)  RR: 17 (12-06-24 @ 05:12) (11 - 18)  SpO2: 98% (12-06-24 @ 05:12) (95% - 100%)  Wt(kg): --    LABS:                          11.5   9.51  )-----------( 187      ( 05 Dec 2024 16:37 )             33.4     12-05    139  |  110[H]  |  20  ----------------------------<  111[H]  4.1   |  24  |  0.77    Ca    8.1[L]      05 Dec 2024 16:37        PT/INR - ( 05 Dec 2024 05:25 )   PT: 12.3 sec;   INR: 1.09 ratio         PTT - ( 05 Dec 2024 05:25 )  PTT:30.9 sec    Gen: Resting in bed, no acute distress  LLE  COLT dressing in place, clean/dry/intact. 2 drains sewn in place.   Ching-incisional tenderness to palpation; otherwise, NTTP throughout the rest of the extremity.   SILT L2-S1.  GSC/TA/EHL/FHL intact. Q/H intact.   DP, PT pulse palpable.   No calf tenderness bilaterally.   Compartments soft and compressible.       Assessment and Plan:  64y Male s/p L knee I&D, poly exchange 12/5    Follow up am labs  Follow cultures  WBAT, knee immobilizer for drains  Please report drain output every shift  Pain management PRN  Antibiotics per ID  DVT PPx: A81 BID  Incentive spirometry  Dispo: pending recommendations per PT  Will discuss with Dr. Newby and advise of any changes to the plan.

## 2024-12-06 NOTE — PROGRESS NOTE ADULT - ASSESSMENT
64yMale w/ c/o L knee incision drainage s/p L TKA on 11/4/24. Able to bear weight in the LLE since sxs began. Denies fevers/chills. Denies numbness/tingling in the LLE. Denies any recent trauma/injuries/sugery/injections. Denies hx of crystal arthopathy or other inflammatory joint disorders, IV drug use, new sexual encounters/STIs, or other infections. At baseline, community ambulator w/o assistive devices.  He was seen by Dr Newby in office as aspirated and the aspirate revealed about 50k WBC.   high wbc count on joint aspirate with PCR with MSSA (methicillin-susceptible Staphylococcus aureus)     MSSA (methicillin-susceptible Staphylococcus aureus) PJI  s/p washout and liner exchange     12/6: s/p I&D and poly exchange performed on 12/5, no fever, RA, no wbc, Cr ok, Tissues smear with no organisms seen, cultures are pending, BCs x 2 pending, Cefazolin IV continued.     Plan:  continue cefazolin 2g q8hrs  awaiting for surgical cultures  awaiting for Blood cultures x 2  follow all cultures   pain control  HIV pending   continue tadalfil that patient takes at home   likely will start rifampin as synergy for staph aureus pji with retention of hardware, recognizing rif may lower efficacy of tadalfil  suggest urology eval for possible change of medication   bowel regimen

## 2024-12-06 NOTE — CONSULT NOTE ADULT - ASSESSMENT
65 Y/O male with a PMHx of BPH admitted for septic knee joint. Urology consulted to evaluate urinary rentention. Patient states he follows with Dr. Elizabeth for BPH and difficulty with urination. He states he has been having difficulty with urination prior to hospitalization which prompted him to see Dr. Elizabeth. At this time, he was placed on Tadalafil and was planning to move forward with a Rezum procedure for his prostate. This morning, he states he was having difficulty with urination and was unable to void which required an intermittent catheter to be placed.      PLAN:     - Start Flomax 0.8mg   - F/U PVR   - Continue care per primary team   - Discussed with Dr. Clements
64yMale w/ c/o L knee incision drainage s/p L TKA on 11/4/24. Able to bear weight in the LLE since sxs began. Denies fevers/chills. Denies numbness/tingling in the LLE. Denies any recent trauma/injuries/sugery/injections. Denies hx of crystal arthopathy or other inflammatory joint disorders, IV drug use, new sexual encounters/STIs, or other infections. At baseline, community ambulator w/o assistive devices.  He was seen by Dr Newby in office as aspirated and the aspirate revealed about 50k WBC.   high wbc count on joint aspirate with PCR with MSSA (methicillin-susceptible Staphylococcus aureus)     MSSA (methicillin-susceptible Staphylococcus aureus) PJI  s/p washout and liner exchange     Plan:  send 2 sets of blood cultures   start cefazolin 2g q8hrs  follow all cultures   pain control  HIV in the AM   continue tadalfil that patient takes at home   likely will start rifampin as synergy for staph aureus pji with retention of hardware, recognizing rif may lower efficacy of tadalfil  suggest urology eval for possible change of medication     Discussed plan with primary team     Sushant Schwartz DO  Chief, Infectious Disease at [facility]  Reachable via Microsoft Teams or ID office: [phone]  Weekdays: After 5pm, please call [phone] for all inquiries and new consults  Weekends: Message on-call infectious disease physician via teams (see Peter)

## 2024-12-06 NOTE — CONSULT NOTE ADULT - SUBJECTIVE AND OBJECTIVE BOX
Chief Complaint:  Patient is a 64y old  Male who presents with a chief complaint of R knee washout (06 Dec 2024 12:53)      HPI:  HPI  64yMale w/ c/o L knee incision drainage s/p L TKA on 11/4/24. Able to bear weight in the LLE since sxs began. Denies fevers/chills. Denies numbness/tingling in the LLE. Denies any recent trauma/injuries/sugery/injections. Denies hx of crystal arthopathy or other inflammatory joint disorders, IV drug use, new sexual encounters/STIs, or other infections. At baseline, community ambulator w/o assistive devices.  He was seen by Dr Newby in office as aspirated and the aspirate revealed about 50k WBC.     ROS  Negative unless otherwise specified in HPI.      Interval Hx:     65 Y/O male with a PMHx of BPH admitted for septic knee joint. Urology consulted to evaluate urinary rentention. Patient states he follows with Dr. Elizabeth for BPH and difficulty with urination. He states he has been having difficulty with urination prior to hospitalization which prompted him to see Dr. Elizabeth. At this time, he was placed on Tadalafil and was planning to move forward with a Rezum procedure for his prostate. This morning, he states he was having difficulty with urination and was unable to void which required an intermittent catheter to be placed. Denies fevers, chills, hematuria, chest pain, SOB.       PAST MEDICAL & SURGICAL Hx  PAST MEDICAL & SURGICAL HISTORY:  History of BPH      H/O rotator cuff surgery  bilateral      S/P ACL reconstruction  left      H/O hemorrhoidectomy          MEDICATIONS  Home Medications:  Pepcid AC 10 mg oral tablet: 1 tab(s) orally once a day (04 Nov 2024 15:24)  tadalafil 5 mg oral tablet: 1 tab(s) orally once a day (04 Nov 2024 15:24)      ALLERGIES  amoxicillin (Rash)      FAMILY Hx  FAMILY HISTORY:      SOCIAL Hx  Social History:      VITALS  Vital Signs Last 24 Hrs  T(C): --  T(F): --  HR: --  BP: --  BP(mean): --  RR: --  SpO2: --        PHYSICAL EXAM  Gen: Lying in bed, non-toxic appearing, NAD  Resp: No increased WOB  LLE:  Skin intact, +effusion and +drainage from incision over L knee  +TTP over L knee, warmer to touch relative to R side; compartments soft  L knee passive ROM _ deg, limited 2/2 pain  Motor: TA/EHL/GS/FHL intact  Sensory: DP/SP/Tib/Ke/Saph SILT  +DP pulse, WWP    LABS            pending         ASSESSMENT & PLAN  64yMale w/ L knee pain s/p arthrocentesis as an outpt, now here as a send in from Dr Newby.   -WBAT LLE, ACE wrap PRN  -Anesthesia stated no need for additional clearance since last surgery was 30 days ago.   -NPO after MN  -pain control  -ice/cold compress, elevation  .............  -Toradol challenge  -MRI for check for effusion and/or to r/o osteo  -abx  -serial aspirations if unable to tolerate surgery (04 Dec 2024 20:07)      PMH/PSH:PAST MEDICAL & SURGICAL HISTORY:  History of BPH      H/O rotator cuff surgery  bilateral      S/P ACL reconstruction  left      H/O hemorrhoidectomy          Allergies:  amoxicillin (Rash)  azithromycin (Unknown)      Medications:  acetaminophen     Tablet .. 975 milliGRAM(s) Oral every 8 hours  acetaminophen   IVPB .. 1000 milliGRAM(s) IV Intermittent once  aspirin enteric coated 81 milliGRAM(s) Oral two times a day  ceFAZolin   IVPB 2000 milliGRAM(s) IV Intermittent every 8 hours  famotidine    Tablet 10 milliGRAM(s) Oral daily  lactated ringers. 1000 milliLiter(s) IV Continuous <Continuous>  magnesium hydroxide Suspension 30 milliLiter(s) Oral daily PRN  melatonin 3 milliGRAM(s) Oral at bedtime  ondansetron Injectable 4 milliGRAM(s) IV Push every 6 hours PRN  oxyCODONE    IR 5 milliGRAM(s) Oral every 4 hours PRN  oxyCODONE    IR 10 milliGRAM(s) Oral every 4 hours PRN  polyethylene glycol 3350 17 Gram(s) Oral at bedtime  povidone iodine 10% Nasal Swab 1 Application(s) Both Nostrils once  senna 2 Tablet(s) Oral at bedtime  sodium chloride 0.9%. 1000 milliLiter(s) IV Continuous <Continuous>  tamsulosin 0.8 milliGRAM(s) Oral at bedtime  traMADol 50 milliGRAM(s) Oral every 6 hours PRN  tranexamic acid IVPB 1000 milliGRAM(s) IV Intermittent once      Review of Systems:  Negative except for HPI    Relevant Family History:   FAMILY HISTORY:      Relevant Social History: Alcohol ( -) , Tobacco ( -) , Illicit drugs (- )     Physical Exam:    Vital Signs:  Vital Signs Last 24 Hrs  T(C): 37.1 (06 Dec 2024 11:15), Max: 37.3 (05 Dec 2024 23:30)  T(F): 98.8 (06 Dec 2024 11:15), Max: 99.2 (05 Dec 2024 23:30)  HR: 72 (06 Dec 2024 11:15) (60 - 74)  BP: 129/75 (06 Dec 2024 11:15) (118/60 - 159/79)  BP(mean): --  RR: 18 (06 Dec 2024 11:15) (11 - 18)  SpO2: 95% (06 Dec 2024 11:15) (95% - 100%)    Parameters below as of 05 Dec 2024 21:00  Patient On (Oxygen Delivery Method): room air      Daily     Daily     General:  Appears stated age, no distress  HEENT:  NC/AT,  conjunctivae clear and pink  Chest:  Full & symmetric excursion, no increased effort  Cardiovascular:  Regular rhythm  Abdomen:  Soft, non tender, non distended  : Circumcised phallus with no testicular, SP, or CVA TTP  Extremities:  RLE with knee immobilizer in place   Skin:  No rash/erythema/ecchymoses/petechiae/wounds/abscess/warm/dry  Neuro/Psych:  Alert, oriented, no asterixis, no tremor, no encephalopathy    Laboratory:                          10.2   8.69  )-----------( 187      ( 06 Dec 2024 05:25 )             30.6     12-06    137  |  106  |  19  ----------------------------<  96  4.6   |  27  |  0.74    Ca    8.3[L]      06 Dec 2024 05:25        PT/INR - ( 05 Dec 2024 05:25 )   PT: 12.3 sec;   INR: 1.09 ratio         PTT - ( 05 Dec 2024 05:25 )  PTT:30.9 sec  Urinalysis Basic - ( 06 Dec 2024 05:25 )    Color: x / Appearance: x / SG: x / pH: x  Gluc: 96 mg/dL / Ketone: x  / Bili: x / Urobili: x   Blood: x / Protein: x / Nitrite: x   Leuk Esterase: x / RBC: x / WBC x   Sq Epi: x / Non Sq Epi: x / Bacteria: x          Intake and Output    12-05-24 @ 07:01  -  12-06-24 @ 07:00  --------------------------------------------------------  IN: 0 mL / OUT: 555 mL / NET: -555 mL    12-06-24 @ 07:01  -  12-06-24 @ 14:38  --------------------------------------------------------  IN: 200 mL / OUT: 741 mL / NET: -541 mL               Chief Complaint:  Patient is a 64y old  Male who presents with a chief complaint of R knee washout (06 Dec 2024 12:53)      HPI:  HPI  64yMale w/ c/o L knee incision drainage s/p L TKA on 11/4/24. Able to bear weight in the LLE since sxs began. Denies fevers/chills. Denies numbness/tingling in the LLE. Denies any recent trauma/injuries/sugery/injections. Denies hx of crystal arthopathy or other inflammatory joint disorders, IV drug use, new sexual encounters/STIs, or other infections. At baseline, community ambulator w/o assistive devices.  He was seen by Dr Newby in office as aspirated and the aspirate revealed about 50k WBC.     ROS  Negative unless otherwise specified in HPI.      Interval Hx:     63 Y/O male with a PMHx of BPH admitted for septic knee joint. Urology consulted to evaluate urinary rentention. Patient states he follows with Dr. Elizabeth for BPH and difficulty with urination. He states he has been having difficulty with urination prior to hospitalization which prompted him to see Dr. Elizabeth. At this time, he was placed on Tadalafil and was planning to move forward with a Rezum procedure for his prostate. His last PSA was drawn in April 2024 and found to be 3.33. States that back in April following a surgical procedure, he had to return to the ED after discharge due to urinary retention and had a pickett catheter placed and was sent home. He underwent a trial of void 2 days later in office with Urologist and was able to void without difficulty. This morning, he states he was having difficulty with urination and was unable to void which required an intermittent catheter to be placed. Denies fevers, chills, hematuria, chest pain, SOB.       PAST MEDICAL & SURGICAL Hx  PAST MEDICAL & SURGICAL HISTORY:  History of BPH      H/O rotator cuff surgery  bilateral      S/P ACL reconstruction  left      H/O hemorrhoidectomy          MEDICATIONS  Home Medications:  Pepcid AC 10 mg oral tablet: 1 tab(s) orally once a day (04 Nov 2024 15:24)  tadalafil 5 mg oral tablet: 1 tab(s) orally once a day (04 Nov 2024 15:24)      ALLERGIES  amoxicillin (Rash)      FAMILY Hx  FAMILY HISTORY:      SOCIAL Hx  Social History:      VITALS  Vital Signs Last 24 Hrs  T(C): --  T(F): --  HR: --  BP: --  BP(mean): --  RR: --  SpO2: --        PHYSICAL EXAM  Gen: Lying in bed, non-toxic appearing, NAD  Resp: No increased WOB  LLE:  Skin intact, +effusion and +drainage from incision over L knee  +TTP over L knee, warmer to touch relative to R side; compartments soft  L knee passive ROM _ deg, limited 2/2 pain  Motor: TA/EHL/GS/FHL intact  Sensory: DP/SP/Tib/Ke/Saph SILT  +DP pulse, WWP    LABS            pending         ASSESSMENT & PLAN  64yMale w/ L knee pain s/p arthrocentesis as an outpt, now here as a send in from Dr Newby.   -WBAT LLE, ACE wrap PRN  -Anesthesia stated no need for additional clearance since last surgery was 30 days ago.   -NPO after MN  -pain control  -ice/cold compress, elevation  .............  -Toradol challenge  -MRI for check for effusion and/or to r/o osteo  -abx  -serial aspirations if unable to tolerate surgery (04 Dec 2024 20:07)      PMH/PSH:PAST MEDICAL & SURGICAL HISTORY:  History of BPH      H/O rotator cuff surgery  bilateral      S/P ACL reconstruction  left      H/O hemorrhoidectomy          Allergies:  amoxicillin (Rash)  azithromycin (Unknown)      Medications:  acetaminophen     Tablet .. 975 milliGRAM(s) Oral every 8 hours  acetaminophen   IVPB .. 1000 milliGRAM(s) IV Intermittent once  aspirin enteric coated 81 milliGRAM(s) Oral two times a day  ceFAZolin   IVPB 2000 milliGRAM(s) IV Intermittent every 8 hours  famotidine    Tablet 10 milliGRAM(s) Oral daily  lactated ringers. 1000 milliLiter(s) IV Continuous <Continuous>  magnesium hydroxide Suspension 30 milliLiter(s) Oral daily PRN  melatonin 3 milliGRAM(s) Oral at bedtime  ondansetron Injectable 4 milliGRAM(s) IV Push every 6 hours PRN  oxyCODONE    IR 5 milliGRAM(s) Oral every 4 hours PRN  oxyCODONE    IR 10 milliGRAM(s) Oral every 4 hours PRN  polyethylene glycol 3350 17 Gram(s) Oral at bedtime  povidone iodine 10% Nasal Swab 1 Application(s) Both Nostrils once  senna 2 Tablet(s) Oral at bedtime  sodium chloride 0.9%. 1000 milliLiter(s) IV Continuous <Continuous>  tamsulosin 0.8 milliGRAM(s) Oral at bedtime  traMADol 50 milliGRAM(s) Oral every 6 hours PRN  tranexamic acid IVPB 1000 milliGRAM(s) IV Intermittent once      Review of Systems:  Negative except for HPI    Relevant Family History:   FAMILY HISTORY:      Relevant Social History: Alcohol ( -) , Tobacco ( -) , Illicit drugs (- )     Physical Exam:    Vital Signs:  Vital Signs Last 24 Hrs  T(C): 37.1 (06 Dec 2024 11:15), Max: 37.3 (05 Dec 2024 23:30)  T(F): 98.8 (06 Dec 2024 11:15), Max: 99.2 (05 Dec 2024 23:30)  HR: 72 (06 Dec 2024 11:15) (60 - 74)  BP: 129/75 (06 Dec 2024 11:15) (118/60 - 159/79)  BP(mean): --  RR: 18 (06 Dec 2024 11:15) (11 - 18)  SpO2: 95% (06 Dec 2024 11:15) (95% - 100%)    Parameters below as of 05 Dec 2024 21:00  Patient On (Oxygen Delivery Method): room air      Daily     Daily     General:  Appears stated age, no distress  HEENT:  NC/AT,  conjunctivae clear and pink  Chest:  Full & symmetric excursion, no increased effort  Cardiovascular:  Regular rhythm  Abdomen:  Soft, non tender, non distended  : Circumcised phallus with no testicular, SP, or CVA TTP  Extremities:  RLE with knee immobilizer in place   Skin:  No rash/erythema/ecchymoses/petechiae/wounds/abscess/warm/dry  Neuro/Psych:  Alert, oriented, no asterixis, no tremor, no encephalopathy    Laboratory:                          10.2   8.69  )-----------( 187      ( 06 Dec 2024 05:25 )             30.6     12-06    137  |  106  |  19  ----------------------------<  96  4.6   |  27  |  0.74    Ca    8.3[L]      06 Dec 2024 05:25        PT/INR - ( 05 Dec 2024 05:25 )   PT: 12.3 sec;   INR: 1.09 ratio         PTT - ( 05 Dec 2024 05:25 )  PTT:30.9 sec  Urinalysis Basic - ( 06 Dec 2024 05:25 )    Color: x / Appearance: x / SG: x / pH: x  Gluc: 96 mg/dL / Ketone: x  / Bili: x / Urobili: x   Blood: x / Protein: x / Nitrite: x   Leuk Esterase: x / RBC: x / WBC x   Sq Epi: x / Non Sq Epi: x / Bacteria: x          Intake and Output    12-05-24 @ 07:01  -  12-06-24 @ 07:00  --------------------------------------------------------  IN: 0 mL / OUT: 555 mL / NET: -555 mL    12-06-24 @ 07:01  -  12-06-24 @ 14:38  --------------------------------------------------------  IN: 200 mL / OUT: 741 mL / NET: -541 mL

## 2024-12-06 NOTE — PROGRESS NOTE ADULT - SUBJECTIVE AND OBJECTIVE BOX
MEL BOWMAN  MRN-[id number]  64y (1960)    Follow Up:      Interval History: The pt was seen and examined earlier, not in acute distress, no new complaints. Pt is afebrile, RA, no WBC.     PAST MEDICAL & SURGICAL HISTORY:  History of BPH      H/O rotator cuff surgery  bilateral      S/P ACL reconstruction  left      H/O hemorrhoidectomy          ROS:    [ ] Unobtainable because:  [ ] All other systems negative    Constitutional: no fever, no chills  Head: no trauma  Eyes: no vision changes, no eye pain  ENT:  no sore throat, no rhinorrhea  Cardiovascular:  no chest pain, no palpitation  Respiratory:  no SOB, no cough  GI:  no abd pain, no vomiting, no diarrhea  urinary: no dysuria, no hematuria, no flank pain  musculoskeletal:  no joint pain, no joint swelling  skin:  no rash  neurology:  no headache, no seizure, no change in mental status  psych: no anxiety, no depression         Allergies  amoxicillin (Rash)  azithromycin (Unknown)        ANTIMICROBIALS:  ceFAZolin   IVPB 2000 every 8 hours      OTHER MEDS:  acetaminophen     Tablet .. 975 milliGRAM(s) Oral every 8 hours  acetaminophen   IVPB .. 1000 milliGRAM(s) IV Intermittent once  aspirin enteric coated 81 milliGRAM(s) Oral two times a day  famotidine    Tablet 10 milliGRAM(s) Oral daily  lactated ringers. 1000 milliLiter(s) IV Continuous <Continuous>  magnesium hydroxide Suspension 30 milliLiter(s) Oral daily PRN  melatonin 3 milliGRAM(s) Oral at bedtime  ondansetron Injectable 4 milliGRAM(s) IV Push every 6 hours PRN  oxyCODONE    IR 5 milliGRAM(s) Oral every 4 hours PRN  oxyCODONE    IR 10 milliGRAM(s) Oral every 4 hours PRN  polyethylene glycol 3350 17 Gram(s) Oral at bedtime  povidone iodine 10% Nasal Swab 1 Application(s) Both Nostrils once  senna 2 Tablet(s) Oral at bedtime  sodium chloride 0.9%. 1000 milliLiter(s) IV Continuous <Continuous>  tamsulosin 0.4 milliGRAM(s) Oral at bedtime  traMADol 50 milliGRAM(s) Oral every 6 hours PRN  tranexamic acid IVPB 1000 milliGRAM(s) IV Intermittent once      Vital Signs Last 24 Hrs  T(C): 37.1 (06 Dec 2024 11:15), Max: 37.3 (05 Dec 2024 23:30)  T(F): 98.8 (06 Dec 2024 11:15), Max: 99.2 (05 Dec 2024 23:30)  HR: 72 (06 Dec 2024 11:15) (60 - 74)  BP: 129/75 (06 Dec 2024 11:15) (118/60 - 159/79)  BP(mean): --  RR: 18 (06 Dec 2024 11:15) (11 - 18)  SpO2: 95% (06 Dec 2024 11:15) (95% - 100%)    Parameters below as of 05 Dec 2024 21:00  Patient On (Oxygen Delivery Method): room air        Physical Exam:  General:    NAD,  non toxic  Head: atraumatic, normocephalic  Eye: normal sclera and conjunctiva  ENT:    no oral lesions, neck supple  Cardio:     regular S1, S2,  no murmur  Respiratory:    clear b/l,    no wheezing  abd:     soft,   BS +,   no tenderness  :   no CVAT,  no suprapubic tenderness,   no  pickett  Musculoskeletal:   no joint swelling,   no edema  vascular: no central lines, +PIV   Skin:    no rash  Neurologic:     no focal deficit  psych: normal affect    WBC Count: 8.69 K/uL (12-06 @ 05:25)  WBC Count: 9.51 K/uL (12-05 @ 16:37)  WBC Count: 5.38 K/uL (12-05 @ 04:40)  WBC Count: 6.64 K/uL (12-04 @ 21:46)                            10.2   8.69  )-----------( 187      ( 06 Dec 2024 05:25 )             30.6       12-06    137  |  106  |  19  ----------------------------<  96  4.6   |  27  |  0.74    Ca    8.3[L]      06 Dec 2024 05:25        Urinalysis Basic - ( 06 Dec 2024 05:25 )    Color: x / Appearance: x / SG: x / pH: x  Gluc: 96 mg/dL / Ketone: x  / Bili: x / Urobili: x   Blood: x / Protein: x / Nitrite: x   Leuk Esterase: x / RBC: x / WBC x   Sq Epi: x / Non Sq Epi: x / Bacteria: x        Creatinine Trend: 0.74<--, 0.77<--, 0.82<--, 0.83<--      MICROBIOLOGY:  v  Tissue  12-05-24 --  --    No polymorphonuclear cells seen per low power field  No organisms seen per oil power field                C-Reactive Protein: 80 (12-04)                RADIOLOGY:     MEL BOWMAN  MRN-[id number]  64y (1960)    Follow Up:  knee infection     Interval History: The pt was seen and examined earlier, not in acute distress, knee pain is controlled, awake and alert, appropriate. Pt is afebrile, RA, no WBC.     PAST MEDICAL & SURGICAL HISTORY:  History of BPH      H/O rotator cuff surgery  bilateral      S/P ACL reconstruction  left      H/O hemorrhoidectomy          ROS:    [ ] Unobtainable because:  [x ] All other systems negative    Constitutional: no fever, no chills  Head: no trauma  Eyes: no vision changes, no eye pain  ENT:  no sore throat, no rhinorrhea  Cardiovascular:  no chest pain, no palpitation  Respiratory:  no SOB, no cough  GI:  no abd pain, no vomiting, no diarrhea  urinary: no dysuria, no hematuria, no flank pain  musculoskeletal:  post op pain is controlled   skin:  no rash  neurology:  no headache, no seizure, no change in mental status  psych: no anxiety, no depression         Allergies  amoxicillin (Rash)  azithromycin (Unknown)        ANTIMICROBIALS:  ceFAZolin   IVPB 2000 every 8 hours      OTHER MEDS:  acetaminophen     Tablet .. 975 milliGRAM(s) Oral every 8 hours  acetaminophen   IVPB .. 1000 milliGRAM(s) IV Intermittent once  aspirin enteric coated 81 milliGRAM(s) Oral two times a day  famotidine    Tablet 10 milliGRAM(s) Oral daily  lactated ringers. 1000 milliLiter(s) IV Continuous <Continuous>  magnesium hydroxide Suspension 30 milliLiter(s) Oral daily PRN  melatonin 3 milliGRAM(s) Oral at bedtime  ondansetron Injectable 4 milliGRAM(s) IV Push every 6 hours PRN  oxyCODONE    IR 5 milliGRAM(s) Oral every 4 hours PRN  oxyCODONE    IR 10 milliGRAM(s) Oral every 4 hours PRN  polyethylene glycol 3350 17 Gram(s) Oral at bedtime  povidone iodine 10% Nasal Swab 1 Application(s) Both Nostrils once  senna 2 Tablet(s) Oral at bedtime  sodium chloride 0.9%. 1000 milliLiter(s) IV Continuous <Continuous>  tamsulosin 0.4 milliGRAM(s) Oral at bedtime  traMADol 50 milliGRAM(s) Oral every 6 hours PRN  tranexamic acid IVPB 1000 milliGRAM(s) IV Intermittent once      Vital Signs Last 24 Hrs  T(C): 37.1 (06 Dec 2024 11:15), Max: 37.3 (05 Dec 2024 23:30)  T(F): 98.8 (06 Dec 2024 11:15), Max: 99.2 (05 Dec 2024 23:30)  HR: 72 (06 Dec 2024 11:15) (60 - 74)  BP: 129/75 (06 Dec 2024 11:15) (118/60 - 159/79)  BP(mean): --  RR: 18 (06 Dec 2024 11:15) (11 - 18)  SpO2: 95% (06 Dec 2024 11:15) (95% - 100%)    Parameters below as of 05 Dec 2024 21:00  Patient On (Oxygen Delivery Method): room air        Physical Exam:  Constitutional: non-toxic, no distress, RA  HEAD/EYES: anicteric, no conjunctival injection  ENT:  supple, no thrush  Cardiovascular:   normal S1, S2, no murmur, no edema  Respiratory:  clear BS bilaterally, no wheezes, no rales  GI:  soft, non-tender, normal bowel sounds  :  no pickett, no CVA tenderness  Musculoskeletal:  left knee heavily wrapped and in brace with 2 drains  Neurologic: awake and alert, normal strength, no focal findings  Skin:  no rash, no erythema, no phlebitis  Heme/Onc: no lymphadenopathy   Psychiatric:  awake, alert, appropriate mood    WBC Count: 8.69 K/uL (12-06 @ 05:25)  WBC Count: 9.51 K/uL (12-05 @ 16:37)  WBC Count: 5.38 K/uL (12-05 @ 04:40)  WBC Count: 6.64 K/uL (12-04 @ 21:46)                            10.2   8.69  )-----------( 187      ( 06 Dec 2024 05:25 )             30.6       12-06    137  |  106  |  19  ----------------------------<  96  4.6   |  27  |  0.74    Ca    8.3[L]      06 Dec 2024 05:25        Urinalysis Basic - ( 06 Dec 2024 05:25 )    Color: x / Appearance: x / SG: x / pH: x  Gluc: 96 mg/dL / Ketone: x  / Bili: x / Urobili: x   Blood: x / Protein: x / Nitrite: x   Leuk Esterase: x / RBC: x / WBC x   Sq Epi: x / Non Sq Epi: x / Bacteria: x        Creatinine Trend: 0.74<--, 0.77<--, 0.82<--, 0.83<--      MICROBIOLOGY:  v  Tissue  12-05-24 --  --    No polymorphonuclear cells seen per low power field  No organisms seen per oil power field      C-Reactive Protein: 80 (12-04)      RADIOLOGY:

## 2024-12-07 ENCOUNTER — NON-APPOINTMENT (OUTPATIENT)
Age: 64
End: 2024-12-07

## 2024-12-07 PROCEDURE — 99252 IP/OBS CONSLTJ NEW/EST SF 35: CPT

## 2024-12-07 RX ORDER — IBUPROFEN 200 MG
400 TABLET ORAL EVERY 8 HOURS
Refills: 0 | Status: DISCONTINUED | OUTPATIENT
Start: 2024-12-07 | End: 2024-12-11

## 2024-12-07 RX ORDER — KETOROLAC TROMETHAMINE 30 MG/ML
15 INJECTION INTRAMUSCULAR; INTRAVENOUS ONCE
Refills: 0 | Status: DISCONTINUED | OUTPATIENT
Start: 2024-12-07 | End: 2024-12-09

## 2024-12-07 RX ADMIN — Medication 25 MILLIGRAM(S): at 13:27

## 2024-12-07 RX ADMIN — ACETAMINOPHEN, DIPHENHYDRAMINE HCL, PHENYLEPHRINE HCL 3 MILLIGRAM(S): 325; 25; 5 TABLET ORAL at 22:22

## 2024-12-07 RX ADMIN — OXYCODONE HYDROCHLORIDE 5 MILLIGRAM(S): 30 TABLET ORAL at 02:30

## 2024-12-07 RX ADMIN — Medication 400 MILLIGRAM(S): at 22:52

## 2024-12-07 RX ADMIN — OXYCODONE HYDROCHLORIDE 5 MILLIGRAM(S): 30 TABLET ORAL at 01:31

## 2024-12-07 RX ADMIN — Medication 100 MILLIGRAM(S): at 05:59

## 2024-12-07 RX ADMIN — ACETAMINOPHEN 500MG 975 MILLIGRAM(S): 500 TABLET, COATED ORAL at 05:58

## 2024-12-07 RX ADMIN — TAMSULOSIN HYDROCHLORIDE 0.8 MILLIGRAM(S): 0.4 CAPSULE ORAL at 22:22

## 2024-12-07 RX ADMIN — Medication 81 MILLIGRAM(S): at 17:17

## 2024-12-07 RX ADMIN — Medication 25 MILLIGRAM(S): at 22:22

## 2024-12-07 RX ADMIN — Medication 75 MILLILITER(S): at 06:00

## 2024-12-07 RX ADMIN — ACETAMINOPHEN 500MG 975 MILLIGRAM(S): 500 TABLET, COATED ORAL at 18:24

## 2024-12-07 RX ADMIN — Medication 400 MILLIGRAM(S): at 22:22

## 2024-12-07 RX ADMIN — TRAMADOL HYDROCHLORIDE 50 MILLIGRAM(S): 300 CAPSULE ORAL at 09:54

## 2024-12-07 RX ADMIN — TRAMADOL HYDROCHLORIDE 50 MILLIGRAM(S): 300 CAPSULE ORAL at 10:54

## 2024-12-07 RX ADMIN — Medication 100 MILLIGRAM(S): at 22:22

## 2024-12-07 RX ADMIN — Medication 400 MILLIGRAM(S): at 13:12

## 2024-12-07 RX ADMIN — ACETAMINOPHEN 500MG 975 MILLIGRAM(S): 500 TABLET, COATED ORAL at 17:17

## 2024-12-07 RX ADMIN — Medication 400 MILLIGRAM(S): at 14:12

## 2024-12-07 RX ADMIN — Medication 100 MILLIGRAM(S): at 13:31

## 2024-12-07 RX ADMIN — Medication 81 MILLIGRAM(S): at 05:58

## 2024-12-07 RX ADMIN — ACETAMINOPHEN 500MG 975 MILLIGRAM(S): 500 TABLET, COATED ORAL at 07:00

## 2024-12-07 RX ADMIN — Medication 25 MILLIGRAM(S): at 05:58

## 2024-12-07 RX ADMIN — Medication 75 MILLILITER(S): at 13:34

## 2024-12-07 NOTE — PROGRESS NOTE ADULT - SUBJECTIVE AND OBJECTIVE BOX
Patient seen and examined bedside with Dr. Juares resting comfortably.  No complaints offered.   Voiding without difficulty.  Denies hematuria and dysuria.  Denies N/V, chest pain, dyspnea, cough.    T(F): 98.2 (12-07-24 @ 05:22), Max: 99.4 (12-06-24 @ 23:45)  HR: 73 (12-07-24 @ 05:22) (70 - 74)  BP: 117/64 (12-07-24 @ 05:22) (117/64 - 149/83)  RR: 18 (12-07-24 @ 05:22) (17 - 18)  SpO2: 98% (12-07-24 @ 05:22) (95% - 98%)  Wt(kg): --  CAPILLARY BLOOD GLUCOSE          PHYSICAL EXAM:  General: NAD, alert and awake  HEENT: NCAT, EOMI, conjunctiva clear  Chest: Nonlabored respirations, good inspiratory effort  Abdomen: Soft, NTND.   Extremities: No pedal edema or calf tenderness noted   : No CVA or SP tenderness.     LABS:                        10.2   8.69  )-----------( 187      ( 06 Dec 2024 05:25 )             30.6   12-06    137  |  106  |  19  ----------------------------<  96  4.6   |  27  |  0.74    Ca    8.3[L]      06 Dec 2024 05:25      I&O's Detail    06 Dec 2024 07:01  -  07 Dec 2024 07:00  --------------------------------------------------------  IN:    IV PiggyBack: 50 mL    Lactated Ringers: 900 mL    Oral Fluid: 200 mL  Total IN: 1150 mL    OUT:    Accordian (mL): 13.5 mL    Accordian (mL): 15 mL    Voided (mL): 2720 mL  Total OUT: 2748.5 mL    Total NET: -1598.5 mL

## 2024-12-07 NOTE — PROGRESS NOTE ADULT - SUBJECTIVE AND OBJECTIVE BOX
Pt seen and examined at bedside, no overnight events, pain controlled. Denies fever chills chest pain SOB new numbness tingling or weakness in the extremities    Vital Signs (24 Hrs):  T(C): 36.8 (12-07-24 @ 05:22), Max: 37.4 (12-06-24 @ 23:45)  HR: 73 (12-07-24 @ 05:22) (70 - 74)  BP: 117/64 (12-07-24 @ 05:22) (117/64 - 149/83)  RR: 18 (12-07-24 @ 05:22) (17 - 18)  SpO2: 98% (12-07-24 @ 05:22) (95% - 98%)  Wt(kg): --    LABS:                          10.2   8.69  )-----------( 187      ( 06 Dec 2024 05:25 )             30.6     12-06    137  |  106  |  19  ----------------------------<  96  4.6   |  27  |  0.74    Ca    8.3[L]      06 Dec 2024 05:25        Gen: Resting in bed, no acute distress  LLE  COLT dressing in place, clean/dry/intact. 2 drains sewn in place.   Ching-incisional tenderness to palpation; otherwise, NTTP throughout the rest of the extremity.   SILT L2-S1.  GSC/TA/EHL/FHL intact. Q/H intact.   DP, PT pulse palpable.   No calf tenderness bilaterally.   Compartments soft and compressible.       Assessment and Plan:  64y Male s/p L knee I&D, poly exchange 12/5    Follow up am labs  Follow cultures  WBAT, knee immobilizer for drains  Please report drain output every shift  Pain management PRN  Antibiotics per ID, will need PICC  Benadryl PRN for rash/itching  DVT PPx: A81 BID  Incentive spirometry  Dispo: home per PT  Will discuss with Dr. Newby and advise of any changes to the plan.

## 2024-12-07 NOTE — PROGRESS NOTE ADULT - ASSESSMENT
65 Y/O male with a PMHx of BPH admitted for septic knee joint. Urology consulted to evaluate urinary rentention. Started on Flomax yesterday with PVR 119cc. Voiding without difficulty. May continue Flomax until outpatient F/U with Dr. Elizabeth. If patient is unable to void, would not intermittently catheterize, would recommend placing pickett catheter and patient may be discharged with pickett cathter with outpatient TOV with Urologist.    PLAN:     - Continue Flomax 0.8mg   - NO further urologic intervention needed at this time; will sign off; please reconsult PRN   - Patient to F/U with outpatient Urologist upon discharge  - Continue care per primary team   - Discussed with Dr. Juares

## 2024-12-08 LAB
ANION GAP SERPL CALC-SCNC: 6 MMOL/L — SIGNIFICANT CHANGE UP (ref 5–17)
BUN SERPL-MCNC: 17 MG/DL — SIGNIFICANT CHANGE UP (ref 7–23)
CALCIUM SERPL-MCNC: 8.4 MG/DL — LOW (ref 8.5–10.1)
CHLORIDE SERPL-SCNC: 111 MMOL/L — HIGH (ref 96–108)
CO2 SERPL-SCNC: 25 MMOL/L — SIGNIFICANT CHANGE UP (ref 22–31)
CREAT SERPL-MCNC: 0.8 MG/DL — SIGNIFICANT CHANGE UP (ref 0.5–1.3)
EGFR: 99 ML/MIN/1.73M2 — SIGNIFICANT CHANGE UP
GLUCOSE SERPL-MCNC: 106 MG/DL — HIGH (ref 70–99)
HCT VFR BLD CALC: 27.9 % — LOW (ref 39–50)
HGB BLD-MCNC: 9.6 G/DL — LOW (ref 13–17)
MCHC RBC-ENTMCNC: 29.1 PG — SIGNIFICANT CHANGE UP (ref 27–34)
MCHC RBC-ENTMCNC: 34.4 G/DL — SIGNIFICANT CHANGE UP (ref 32–36)
MCV RBC AUTO: 84.5 FL — SIGNIFICANT CHANGE UP (ref 80–100)
NRBC # BLD: 0 /100 WBCS — SIGNIFICANT CHANGE UP (ref 0–0)
PLATELET # BLD AUTO: 191 K/UL — SIGNIFICANT CHANGE UP (ref 150–400)
POTASSIUM SERPL-MCNC: 4.3 MMOL/L — SIGNIFICANT CHANGE UP (ref 3.5–5.3)
POTASSIUM SERPL-SCNC: 4.3 MMOL/L — SIGNIFICANT CHANGE UP (ref 3.5–5.3)
RBC # BLD: 3.3 M/UL — LOW (ref 4.2–5.8)
RBC # FLD: 12.9 % — SIGNIFICANT CHANGE UP (ref 10.3–14.5)
SODIUM SERPL-SCNC: 142 MMOL/L — SIGNIFICANT CHANGE UP (ref 135–145)
WBC # BLD: 5.04 K/UL — SIGNIFICANT CHANGE UP (ref 3.8–10.5)
WBC # FLD AUTO: 5.04 K/UL — SIGNIFICANT CHANGE UP (ref 3.8–10.5)

## 2024-12-08 RX ADMIN — ACETAMINOPHEN 500MG 975 MILLIGRAM(S): 500 TABLET, COATED ORAL at 23:19

## 2024-12-08 RX ADMIN — Medication 25 MILLIGRAM(S): at 13:26

## 2024-12-08 RX ADMIN — Medication 100 MILLIGRAM(S): at 13:27

## 2024-12-08 RX ADMIN — ACETAMINOPHEN 500MG 975 MILLIGRAM(S): 500 TABLET, COATED ORAL at 03:40

## 2024-12-08 RX ADMIN — Medication 100 MILLIGRAM(S): at 22:52

## 2024-12-08 RX ADMIN — ACETAMINOPHEN, DIPHENHYDRAMINE HCL, PHENYLEPHRINE HCL 3 MILLIGRAM(S): 325; 25; 5 TABLET ORAL at 22:49

## 2024-12-08 RX ADMIN — Medication 81 MILLIGRAM(S): at 08:44

## 2024-12-08 RX ADMIN — Medication 400 MILLIGRAM(S): at 13:26

## 2024-12-08 RX ADMIN — ACETAMINOPHEN 500MG 975 MILLIGRAM(S): 500 TABLET, COATED ORAL at 22:49

## 2024-12-08 RX ADMIN — Medication 400 MILLIGRAM(S): at 08:45

## 2024-12-08 RX ADMIN — Medication 81 MILLIGRAM(S): at 17:44

## 2024-12-08 RX ADMIN — ACETAMINOPHEN 500MG 975 MILLIGRAM(S): 500 TABLET, COATED ORAL at 04:10

## 2024-12-08 RX ADMIN — Medication 400 MILLIGRAM(S): at 09:45

## 2024-12-08 RX ADMIN — TAMSULOSIN HYDROCHLORIDE 0.8 MILLIGRAM(S): 0.4 CAPSULE ORAL at 22:49

## 2024-12-08 RX ADMIN — Medication 400 MILLIGRAM(S): at 14:26

## 2024-12-08 RX ADMIN — Medication 100 MILLIGRAM(S): at 06:39

## 2024-12-08 NOTE — PROGRESS NOTE ADULT - SUBJECTIVE AND OBJECTIVE BOX
Patient seen and examined at bedside. Patient reports pain well controlled on medications. No acute events overnight. Pt denies fevers, chills, new onset numbness, weakness or tingling in the extremities.    T(C): 36.9 (12-08-24 @ 05:14), Max: 37.2 (12-07-24 @ 16:39)  T(F): 98.5 (12-08-24 @ 05:14), Max: 98.9 (12-07-24 @ 16:39)  HR: 64 (12-08-24 @ 05:14) (64 - 78)  BP: 135/80 (12-08-24 @ 05:14) (121/65 - 140/78)  RR: 19 (12-08-24 @ 05:14) (17 - 19)  SpO2: 95% (12-08-24 @ 05:14) (95% - 97%)    Gen: Resting in bed, no acute distress  LLE  COLT dressing in place, clean/dry/intact. 1 drain sewn in place.   Ching-incisional tenderness to palpation; otherwise, NTTP throughout the rest of the extremity.   SILT L2-S1.  GSC/TA/EHL/FHL intact. Q/H intact.   DP, PT pulse palpable.   No calf tenderness bilaterally.   Compartments soft and compressible.       Assessment and Plan:  64y Male s/p L knee I&D, poly exchange 12/5    Follow up am labs  Follow cultures  WBAT, knee immobilizer for drain  HMV dc'd 12/7  COLT dc'd 12/8  2nd drain possibly DC 12/9  Please report drain output every shift  Pain management PRN  Antibiotics per ID, will need PICC  Benadryl PRN for rash/itching  DVT PPx: A81 BID  Incentive spirometry  Dispo: home per PT  Will discuss with Dr. Newby and advise of any changes to the plan.

## 2024-12-08 NOTE — PROVIDER CONTACT NOTE (CRITICAL VALUE NOTIFICATION) - TEST AND RESULT REPORTED:
joint PCR panel positive for staphylococcus aureus
Joint c/s Left knee preliminary #2 staphylococcus aureus

## 2024-12-09 LAB
ANION GAP SERPL CALC-SCNC: 2 MMOL/L — LOW (ref 5–17)
BUN SERPL-MCNC: 16 MG/DL — SIGNIFICANT CHANGE UP (ref 7–23)
CALCIUM SERPL-MCNC: 8.7 MG/DL — SIGNIFICANT CHANGE UP (ref 8.5–10.1)
CHLORIDE SERPL-SCNC: 109 MMOL/L — HIGH (ref 96–108)
CO2 SERPL-SCNC: 28 MMOL/L — SIGNIFICANT CHANGE UP (ref 22–31)
CREAT SERPL-MCNC: 0.86 MG/DL — SIGNIFICANT CHANGE UP (ref 0.5–1.3)
EGFR: 97 ML/MIN/1.73M2 — SIGNIFICANT CHANGE UP
GLUCOSE SERPL-MCNC: 99 MG/DL — SIGNIFICANT CHANGE UP (ref 70–99)
HCT VFR BLD CALC: 29.8 % — LOW (ref 39–50)
HGB BLD-MCNC: 10.3 G/DL — LOW (ref 13–17)
MCHC RBC-ENTMCNC: 29 PG — SIGNIFICANT CHANGE UP (ref 27–34)
MCHC RBC-ENTMCNC: 34.6 G/DL — SIGNIFICANT CHANGE UP (ref 32–36)
MCV RBC AUTO: 83.9 FL — SIGNIFICANT CHANGE UP (ref 80–100)
NRBC # BLD: 0 /100 WBCS — SIGNIFICANT CHANGE UP (ref 0–0)
PLATELET # BLD AUTO: 255 K/UL — SIGNIFICANT CHANGE UP (ref 150–400)
POTASSIUM SERPL-MCNC: 4.1 MMOL/L — SIGNIFICANT CHANGE UP (ref 3.5–5.3)
POTASSIUM SERPL-SCNC: 4.1 MMOL/L — SIGNIFICANT CHANGE UP (ref 3.5–5.3)
RBC # BLD: 3.55 M/UL — LOW (ref 4.2–5.8)
RBC # FLD: 12.8 % — SIGNIFICANT CHANGE UP (ref 10.3–14.5)
SODIUM SERPL-SCNC: 139 MMOL/L — SIGNIFICANT CHANGE UP (ref 135–145)
WBC # BLD: 5.89 K/UL — SIGNIFICANT CHANGE UP (ref 3.8–10.5)
WBC # FLD AUTO: 5.89 K/UL — SIGNIFICANT CHANGE UP (ref 3.8–10.5)

## 2024-12-09 PROCEDURE — 99232 SBSQ HOSP IP/OBS MODERATE 35: CPT

## 2024-12-09 RX ORDER — CEFAZOLIN SODIUM 10 G
2 VIAL (EA) INJECTION
Qty: 42 | Refills: 0
Start: 2024-12-09 | End: 2025-01-19

## 2024-12-09 RX ADMIN — Medication 100 MILLIGRAM(S): at 05:56

## 2024-12-09 RX ADMIN — Medication 400 MILLIGRAM(S): at 03:38

## 2024-12-09 RX ADMIN — ACETAMINOPHEN 500MG 975 MILLIGRAM(S): 500 TABLET, COATED ORAL at 22:50

## 2024-12-09 RX ADMIN — Medication 100 MILLIGRAM(S): at 13:14

## 2024-12-09 RX ADMIN — ACETAMINOPHEN 500MG 975 MILLIGRAM(S): 500 TABLET, COATED ORAL at 21:51

## 2024-12-09 RX ADMIN — Medication 25 MILLIGRAM(S): at 13:12

## 2024-12-09 RX ADMIN — Medication 400 MILLIGRAM(S): at 04:08

## 2024-12-09 RX ADMIN — FAMOTIDINE 10 MILLIGRAM(S): 20 TABLET, FILM COATED ORAL at 11:33

## 2024-12-09 RX ADMIN — Medication 400 MILLIGRAM(S): at 13:11

## 2024-12-09 RX ADMIN — TAMSULOSIN HYDROCHLORIDE 0.8 MILLIGRAM(S): 0.4 CAPSULE ORAL at 21:58

## 2024-12-09 RX ADMIN — Medication 81 MILLIGRAM(S): at 08:23

## 2024-12-09 RX ADMIN — Medication 100 MILLIGRAM(S): at 21:57

## 2024-12-09 RX ADMIN — KETOROLAC TROMETHAMINE 15 MILLIGRAM(S): 30 INJECTION INTRAMUSCULAR; INTRAVENOUS at 18:00

## 2024-12-09 RX ADMIN — ACETAMINOPHEN, DIPHENHYDRAMINE HCL, PHENYLEPHRINE HCL 3 MILLIGRAM(S): 325; 25; 5 TABLET ORAL at 21:51

## 2024-12-09 RX ADMIN — Medication 400 MILLIGRAM(S): at 13:15

## 2024-12-09 RX ADMIN — KETOROLAC TROMETHAMINE 15 MILLIGRAM(S): 30 INJECTION INTRAMUSCULAR; INTRAVENOUS at 17:42

## 2024-12-09 RX ADMIN — Medication 81 MILLIGRAM(S): at 17:42

## 2024-12-09 NOTE — PHARMACOTHERAPY INTERVENTION NOTE - COMMENTS
Modified allergy header to state patient received/tolerated cefazolin during this admission with no documented reaction.

## 2024-12-09 NOTE — PROGRESS NOTE ADULT - ATTENDING COMMENTS
Patient returned phone call.   patient seen and examined. wbat.  knee rom. PT  picc  abx per ID  pain control  dvtp  will likely pull drain and plan for DC home once picc placed

## 2024-12-09 NOTE — PROGRESS NOTE ADULT - SUBJECTIVE AND OBJECTIVE BOX
MEL BOWMAN  MRN-[id number]  64y (1960)    Follow Up:  knee infection     Interval History: The pt was seen and examined earlier, not in acute distress, no new complaints, awake and alert, appropriate, out of bed to chair, having lunch. Pt is afebrile, RA, no WBC.     PAST MEDICAL & SURGICAL HISTORY:  History of BPH      H/O rotator cuff surgery  bilateral      S/P ACL reconstruction  left      H/O hemorrhoidectomy          ROS:    [ ] Unobtainable because:  [x ] All other systems negative    Constitutional: no fever, no chills  Head: no trauma  Eyes: no vision changes, no eye pain  ENT:  no sore throat, no rhinorrhea  Cardiovascular:  no chest pain, no palpitation  Respiratory:  no SOB, no cough  GI:  no abd pain, no vomiting, no diarrhea  urinary: no dysuria, no hematuria, no flank pain  musculoskeletal:  no joint pain, no joint swelling  skin:  no rash  neurology:  no headache, no seizure, no change in mental status  psych: no anxiety, no depression         Allergies  amoxicillin (Rash)  azithromycin (Unknown)        ANTIMICROBIALS:  ceFAZolin   IVPB 2000 every 8 hours  rifAMPin 600 daily      OTHER MEDS:  acetaminophen     Tablet .. 975 milliGRAM(s) Oral every 8 hours  acetaminophen   IVPB .. 1000 milliGRAM(s) IV Intermittent once  aluminum hydroxide/magnesium hydroxide/simethicone Suspension 30 milliLiter(s) Oral every 6 hours PRN  aspirin enteric coated 81 milliGRAM(s) Oral two times a day  benzocaine/menthol Lozenge 1 Lozenge Oral every 4 hours PRN  diphenhydrAMINE 25 milliGRAM(s) Oral every 4 hours PRN  famotidine    Tablet 10 milliGRAM(s) Oral daily  ibuprofen  Tablet. 400 milliGRAM(s) Oral every 8 hours  ketorolac   Injectable 15 milliGRAM(s) IV Push once PRN  magnesium hydroxide Suspension 30 milliLiter(s) Oral daily PRN  melatonin 3 milliGRAM(s) Oral at bedtime  ondansetron Injectable 4 milliGRAM(s) IV Push every 6 hours PRN  oxyCODONE    IR 5 milliGRAM(s) Oral every 4 hours PRN  oxyCODONE    IR 10 milliGRAM(s) Oral every 4 hours PRN  polyethylene glycol 3350 17 Gram(s) Oral at bedtime  povidone iodine 10% Nasal Swab 1 Application(s) Both Nostrils once  senna 2 Tablet(s) Oral at bedtime  tamsulosin 0.8 milliGRAM(s) Oral at bedtime  traMADol 50 milliGRAM(s) Oral every 6 hours PRN  tranexamic acid IVPB 1000 milliGRAM(s) IV Intermittent once      Vital Signs Last 24 Hrs  T(C): 37 (09 Dec 2024 10:54), Max: 37 (09 Dec 2024 10:54)  T(F): 98.6 (09 Dec 2024 10:54), Max: 98.6 (09 Dec 2024 10:54)  HR: 81 (09 Dec 2024 10:54) (61 - 81)  BP: 150/79 (09 Dec 2024 10:54) (140/80 - 166/74)  BP(mean): --  RR: 17 (09 Dec 2024 10:54) (17 - 19)  SpO2: 97% (09 Dec 2024 10:54) (96% - 99%)    Parameters below as of 09 Dec 2024 10:54  Patient On (Oxygen Delivery Method): room air        Physical Exam:  Constitutional: non-toxic, no distress, RA  HEAD/EYES: anicteric, no conjunctival injection  ENT:  supple, no thrush  Cardiovascular:   normal S1, S2, no murmur, no edema  Respiratory:  clear BS bilaterally, no wheezes, no rales  GI:  soft, non-tender, normal bowel sounds  :  no pickett, no CVA tenderness  Musculoskeletal:  left knee with sutures, wound is clean, no drainage, ice pack is on, mild appropriate post op swelling, drains removed prior by the surgical team    Neurologic: awake and alert, normal strength, no focal findings  Skin:  no rash, no erythema, no phlebitis  Heme/Onc: no lymphadenopathy   Psychiatric:  awake, alert, appropriate mood    WBC Count: 5.89 K/uL (12-09 @ 07:53)  WBC Count: 5.04 K/uL (12-08 @ 07:40)  WBC Count: 8.69 K/uL (12-06 @ 05:25)  WBC Count: 9.51 K/uL (12-05 @ 16:37)  WBC Count: 5.38 K/uL (12-05 @ 04:40)  WBC Count: 6.64 K/uL (12-04 @ 21:46)                            10.3   5.89  )-----------( 255      ( 09 Dec 2024 07:53 )             29.8       12-09    139  |  109[H]  |  16  ----------------------------<  99  4.1   |  28  |  0.86    Ca    8.7      09 Dec 2024 07:53        Urinalysis Basic - ( 09 Dec 2024 07:53 )    Color: x / Appearance: x / SG: x / pH: x  Gluc: 99 mg/dL / Ketone: x  / Bili: x / Urobili: x   Blood: x / Protein: x / Nitrite: x   Leuk Esterase: x / RBC: x / WBC x   Sq Epi: x / Non Sq Epi: x / Bacteria: x        Creatinine Trend: 0.86<--, 0.80<--, 0.74<--, 0.77<--, 0.82<--, 0.83<--      MICROBIOLOGY:  v  .Blood BLOOD  12-06-24   No growth at 72 Hours  --  --      Tissue  12-05-24   No growth  --    No polymorphonuclear cells seen per low power field  No organisms seen per oil power field    C-Reactive Protein: 80 (12-04)    RADIOLOGY:

## 2024-12-09 NOTE — PROGRESS NOTE ADULT - SUBJECTIVE AND OBJECTIVE BOX
Patient seen and examined at bedside. Patient reports pain well controlled on medications. No acute events overnight. Pt denies fevers, chills, new onset numbness, weakness or tingling in the extremities.    T(C): 36.9 (12-08-24 @ 05:14), Max: 37.2 (12-07-24 @ 16:39)  T(F): 98.5 (12-08-24 @ 05:14), Max: 98.9 (12-07-24 @ 16:39)  HR: 64 (12-08-24 @ 05:14) (64 - 78)  BP: 135/80 (12-08-24 @ 05:14) (121/65 - 140/78)  RR: 19 (12-08-24 @ 05:14) (17 - 19)  SpO2: 95% (12-08-24 @ 05:14) (95% - 97%)    Gen: Resting in bed, no acute distress  LLE  COLT dressing in place, clean/dry/intact. 1 drain sewn in place.   Ching-incisional tenderness to palpation; otherwise, NTTP throughout the rest of the extremity.   SILT L2-S1.  GSC/TA/EHL/FHL intact. Q/H intact.   DP, PT pulse palpable.   No calf tenderness bilaterally.   Compartments soft and compressible.       Assessment and Plan:  64y Male s/p L knee I&D, poly exchange 12/5    Follow up am labs  Follow cultures  WBAT, knee immobilizer for drain  HMV dc'd 12/7  COLT dc'd 12/8  2nd drain possibly DC today  Please report drain output every shift  Pain management PRN  Antibiotics per ID, will need PICC  Benadryl PRN for rash/itching  DVT PPx: A81 BID  Incentive spirometry  Dispo: home per PT  Will discuss with Dr. Newby and advise of any changes to the plan.  Patient seen and examined at bedside. Patient reports pain well controlled on medications. No acute events overnight. Pt denies fevers, chills, new onset numbness, weakness or tingling in the extremities.    Vital Signs (24 Hrs):  T(C): 36.7 (12-08-24 @ 16:43), Max: 37 (12-08-24 @ 09:37)  HR: 72 (12-08-24 @ 17:51) (64 - 84)  BP: 140/80 (12-08-24 @ 17:51) (135/80 - 166/74)  RR: 18 (12-08-24 @ 16:43) (18 - 19)  SpO2: 99% (12-08-24 @ 16:43) (94% - 99%)  Wt(kg): --    LABS:                          9.6    5.04  )-----------( 191      ( 08 Dec 2024 07:40 )             27.9     12-08    142  |  111[H]  |  17  ----------------------------<  106[H]  4.3   |  25  |  0.80    Ca    8.4[L]      08 Dec 2024 07:40            Gen: Resting in bed, no acute distress  LLE  COLT dressing in place, clean/dry/intact. 1 drain sewn in place.   Ching-incisional tenderness to palpation; otherwise, NTTP throughout the rest of the extremity.   SILT L2-S1.  GSC/TA/EHL/FHL intact. Q/H intact.   DP, PT pulse palpable.   No calf tenderness bilaterally.   Compartments soft and compressible.       Assessment and Plan:  64y Male s/p L knee I&D, poly exchange 12/5    Follow up am labs  Follow cultures  WBAT, knee immobilizer for drain  HMV dc'd 12/7  COLT dc'd 12/8  2nd drain possibly DC today  Please report drain output every shift  Pain management PRN  Antibiotics per ID, will need PICC  Benadryl PRN for rash/itching  DVT PPx: A81 BID  Incentive spirometry  Dispo: home per PT  Will discuss with Dr. Newby and advise of any changes to the plan.  Patient seen and examined at bedside. Patient reports pain well controlled on medications. No acute events overnight. Pt denies fevers, chills, new onset numbness, weakness or tingling in the extremities.    Vital Signs (24 Hrs):  T(C): 36.7 (12-08-24 @ 16:43), Max: 37 (12-08-24 @ 09:37)  HR: 72 (12-08-24 @ 17:51) (64 - 84)  BP: 140/80 (12-08-24 @ 17:51) (135/80 - 166/74)  RR: 18 (12-08-24 @ 16:43) (18 - 19)  SpO2: 99% (12-08-24 @ 16:43) (94% - 99%)  Wt(kg): --    LABS:                          9.6    5.04  )-----------( 191      ( 08 Dec 2024 07:40 )             27.9     12-08    142  |  111[H]  |  17  ----------------------------<  106[H]  4.3   |  25  |  0.80    Ca    8.4[L]      08 Dec 2024 07:40            Gen: Resting in bed, no acute distress  LLE  inc clean/dry/intact. 1 drain sewn in place.   Ching-incisional tenderness to palpation; otherwise, NTTP throughout the rest of the extremity.   SILT L2-S1.  GSC/TA/EHL/FHL intact. Q/H intact.   DP, PT pulse palpable.   No calf tenderness bilaterally.   Compartments soft and compressible.       Assessment and Plan:  64y Male s/p L knee I&D, poly exchange 12/5    Follow up am labs  Follow cultures  WBAT, knee immobilizer for drain  HMV dc'd 12/7  COLT dc'd 12/8  2nd drain possibly DC today  Please report drain output every shift  Pain management PRN  Antibiotics per ID, will need PICC  Benadryl PRN for rash/itching  DVT PPx: A81 BID  Incentive spirometry  Dispo: home per PT  Will discuss with Dr. Newby and advise of any changes to the plan.

## 2024-12-09 NOTE — PROGRESS NOTE ADULT - ASSESSMENT
64yMale w/ c/o L knee incision drainage s/p L TKA on 11/4/24. Able to bear weight in the LLE since sxs began. Denies fevers/chills. Denies numbness/tingling in the LLE. Denies any recent trauma/injuries/sugery/injections. Denies hx of crystal arthopathy or other inflammatory joint disorders, IV drug use, new sexual encounters/STIs, or other infections. At baseline, community ambulator w/o assistive devices.  He was seen by Dr Newby in office as aspirated and the aspirate revealed about 50k WBC.   high wbc count on joint aspirate with PCR with MSSA (methicillin-susceptible Staphylococcus aureus)     MSSA (methicillin-susceptible Staphylococcus aureus) PJI  s/p washout and liner exchange     12/6: s/p I&D and poly exchange performed on 12/5, no fever, RA, no wbc, Cr ok, Tissues smear with no organisms seen, cultures are pending, BCs x 2 pending, Cefazolin IV continued.   Attending addendum:  continue cefazolin 2g q8hrs  awaiting for surgical cultures  awaiting for Blood cultures x 2  follow all cultures   pain control  urology consult   will potentially start rifampin if able to     12/9: drains removed, no fevers, RA, no wbc, Cr ok, BCs NGTD x 2, HIV negative, MRSA PCR negative, surgical cultures NGTD, office aspirate culture grew MSSA, pt is on IV Cefazolin, will add po rifampin for synergy, pt will need a picc line and six weeks of abx from the day of I&D.     Plan:  continue cefazolin 2g q8hrs  start po rifampin 600 mg daily   every physician must run drug interaction report while pt is on rifampin  follow all culture data   pain control  HIV negative   continue tadalfil that patient takes at home - pt was started on rifampin, may lower efficacy of tadalfil   urology evaluation is appreciated   bowel regimen   pt will need a picc line    when ready for discharge:  place a picc line  continue cefazolin IV 2 grams q 8 hrs - last dose 1/15/2024  continue rifampin 600 mg daily po - last dose 1/15/2024  all physicians must run drug interaction report while pt is on rifmpin  weekly lab work: cbc with diff, cmp, esr, crp - email to Dr. Schwartz at [email or web address]  follow up with Dr. Schwartz in the office:  [address] [place] [address] [place] [address] [phone]  remove picc line upon completion of the course of abx  Rx created and provided to the     discussed with Dr. Bazan  discussed with the   discussed with the pt  discussed with RN 64yMale w/ c/o L knee incision drainage s/p L TKA on 11/4/24. Able to bear weight in the LLE since sxs began. Denies fevers/chills. Denies numbness/tingling in the LLE. Denies any recent trauma/injuries/sugery/injections. Denies hx of crystal arthopathy or other inflammatory joint disorders, IV drug use, new sexual encounters/STIs, or other infections. At baseline, community ambulator w/o assistive devices.  He was seen by Dr Newby in office as aspirated and the aspirate revealed about 50k WBC.   high wbc count on joint aspirate with PCR with MSSA (methicillin-susceptible Staphylococcus aureus)     MSSA (methicillin-susceptible Staphylococcus aureus) PJI  s/p washout and liner exchange     12/6: s/p I&D and poly exchange performed on 12/5, no fever, RA, no wbc, Cr ok, Tissues smear with no organisms seen, cultures are pending, BCs x 2 pending, Cefazolin IV continued.   Attending addendum:  continue cefazolin 2g q8hrs  awaiting for surgical cultures  awaiting for Blood cultures x 2  follow all cultures   pain control  urology consult   will potentially start rifampin if able to     12/9: drains removed, no fevers, RA, no wbc, Cr ok, BCs NGTD x 2, HIV negative, MRSA PCR negative, surgical cultures NGTD, office aspirate culture grew MSSA, pt is on IV Cefazolin, will add po rifampin for synergy, pt will need a picc line and six weeks of abx from the day of I&D.     Plan:  continue cefazolin 2g q8hrs  start po rifampin 600 mg daily   every provider must run drug interaction report while pt is on rifampin  follow all culture data   pain control  HIV negative   stopped tadalafil and started flomax by urology   urology evaluation is appreciated   bowel regimen   pt will need a picc line    when ready for discharge:  place a picc line  continue cefazolin IV 2 grams q 8 hrs - last dose 1/15/2024  continue rifampin 600 mg daily po - last dose 1/15/2024  all provider must run drug interaction report while pt is on rifmpin  weekly lab work: cbc with diff, cmp, esr, crp - email to Dr. Schwartz at [email or web address]  follow up with Dr. Schwartz in the office:  [address] [place] [address] [place] [address] [phone]  remove picc line upon completion of the course of abx  Rx created and provided to the     discussed with Dr. Schwartz  discussed with the   discussed with the pt  discussed with RN

## 2024-12-09 NOTE — CONSULT NOTE ADULT - SUBJECTIVE AND OBJECTIVE BOX
Interventional Radiology    Evaluate for Procedure: PICC line placement    HPI: 64y Male with w/ L knee incision drainage s/p L TKA on 11/4/24. He was seen by Dr Newby in office as aspirated and the aspirate revealed about 50k WBC. High wbc count on joint aspirate with PCR with MSSA (methicillin-susceptible Staphylococcus aureus). s/p I&D and poly exchange performed on 12/5. Plan per ID for 6 weeks IV abx. IR consulted for PICC line placement.     Allergies: amoxicillin (Rash)  azithromycin (Unknown)    Medications (Abx/Cardiac/Anticoagulation/Blood Products)    aspirin enteric coated: 81 milliGRAM(s) Oral (12-09 @ 08:23)  ceFAZolin   IVPB: 100 mL/Hr IV Intermittent (12-09 @ 13:14)    Data:    T(C): 37  HR: 81  BP: 150/79  RR: 17  SpO2: 97%    -WBC 5.89 / HgB 10.3 / Hct 29.8 / Plt 255  -Na 139 / Cl 109 / BUN 16 / Glucose 99  -K 4.1 / CO2 28 / Cr 0.86  -ALT -- / Alk Phos -- / T.Bili --  -INR 1.09 / PTT 30.9    Radiology: Reviewed    Assessment/Plan:   -64y Male with w/ L knee incision drainage s/p L TKA on 11/4/24. He was seen by Dr Newby in office as aspirated and the aspirate revealed about 50k WBC. High wbc count on joint aspirate with PCR with MSSA (methicillin-susceptible Staphylococcus aureus). s/p I&D and poly exchange performed on 12/5. Plan per ID for 6 weeks IV abx. IR consulted for PICC line placement.       - case reviewed and approved, plan for PICC line placement tomorrow  - IR PICC ordered  - STAT labs in AM (cbc,coags, bmp, T&S)  - does not need to be NPO  - d/w primary team      Interventional Radiology  ------------------------------------  For emergent consults, please call [phone], or call or message on TEAMs.   For non-emergent consults, consults after hours or over the weekend, please place IR Consult order.

## 2024-12-09 NOTE — PROGRESS NOTE ADULT - NS ATTEND AMEND GEN_ALL_CORE FT
I agree with the statements above
I have reviewed all pertinent clinical information and agree with the NP's note.  Any new labs, recent cultures, new imaging (if applicable) and vitals have been reviewed today.  All necessary adjustments to management have been made.  Agree with the above assessment and plan.    continue cefazolin 2g q8hrs  awaiting for surgical cultures  awaiting for Blood cultures x 2  follow all cultures   pain control  urology consult   will potentially start rifampin if able to     Discussed plan with ortho team    Sushant Schwartz DO  Chief, Infectious Disease at [facility]  Reachable via Microsoft Teams or ID office: [phone]  Weekdays: After 5pm, please call [phone] for all inquiries and new consults  Weekends: Message on-call infectious disease physician via teams (abdiel Green)
I have reviewed all pertinent clinical information and agree with the NP's note.  Any new labs, recent cultures, new imaging (if applicable) and vitals have been reviewed today.  All necessary adjustments to management have been made.  Agree with the above assessment and plan.    after plan was set had lengthy discussion with patient about the risks and side effects or long term antibiotics, cefazolin and picc line side effects   he plans on going to back to work with the picc line in and would have to be somewhere, perhaps his car or public bathroom to administer the afternoon dose. This is not ideal nor practical. We discussed doing a nafcillin pump. he was allergic to amoxicillin 30 years ago and patients who had documented anaphylaxis to penicillin lose the allergy 80=90% of the time when 10 years has passed. After discussion with the patient we will do a amoxicillin challenge tomorrow and if tolerates will start nafcillin and give rx to do a nafcillin pump. Will start rifampin today as retained hardware and MSSA (methicillin-susceptible Staphylococcus aureus) infection      Discussed plan with patients primary team.    Sushant Schwartz DO  Chief, Infectious Disease at [facility]  Reachable via Microsoft Teams or ID office: [phone]  Weekdays: After 5pm, please call [phone] for all inquiries and new consults  Weekends: Message on-call infectious disease physician via teams (abdiel Green)

## 2024-12-10 LAB
ANION GAP SERPL CALC-SCNC: 3 MMOL/L — LOW (ref 5–17)
BUN SERPL-MCNC: 18 MG/DL — SIGNIFICANT CHANGE UP (ref 7–23)
CALCIUM SERPL-MCNC: 8.4 MG/DL — LOW (ref 8.5–10.1)
CHLORIDE SERPL-SCNC: 109 MMOL/L — HIGH (ref 96–108)
CO2 SERPL-SCNC: 27 MMOL/L — SIGNIFICANT CHANGE UP (ref 22–31)
CREAT SERPL-MCNC: 0.89 MG/DL — SIGNIFICANT CHANGE UP (ref 0.5–1.3)
EGFR: 96 ML/MIN/1.73M2 — SIGNIFICANT CHANGE UP
GLUCOSE SERPL-MCNC: 104 MG/DL — HIGH (ref 70–99)
HCT VFR BLD CALC: 28.5 % — LOW (ref 39–50)
HGB BLD-MCNC: 9.7 G/DL — LOW (ref 13–17)
MCHC RBC-ENTMCNC: 28.5 PG — SIGNIFICANT CHANGE UP (ref 27–34)
MCHC RBC-ENTMCNC: 34 G/DL — SIGNIFICANT CHANGE UP (ref 32–36)
MCV RBC AUTO: 83.8 FL — SIGNIFICANT CHANGE UP (ref 80–100)
NRBC # BLD: 0 /100 WBCS — SIGNIFICANT CHANGE UP (ref 0–0)
PLATELET # BLD AUTO: 256 K/UL — SIGNIFICANT CHANGE UP (ref 150–400)
POTASSIUM SERPL-MCNC: 4.4 MMOL/L — SIGNIFICANT CHANGE UP (ref 3.5–5.3)
POTASSIUM SERPL-SCNC: 4.4 MMOL/L — SIGNIFICANT CHANGE UP (ref 3.5–5.3)
RBC # BLD: 3.4 M/UL — LOW (ref 4.2–5.8)
RBC # FLD: 12.8 % — SIGNIFICANT CHANGE UP (ref 10.3–14.5)
SODIUM SERPL-SCNC: 139 MMOL/L — SIGNIFICANT CHANGE UP (ref 135–145)
WBC # BLD: 6.51 K/UL — SIGNIFICANT CHANGE UP (ref 3.8–10.5)
WBC # FLD AUTO: 6.51 K/UL — SIGNIFICANT CHANGE UP (ref 3.8–10.5)

## 2024-12-10 PROCEDURE — 36573 INSJ PICC RS&I 5 YR+: CPT

## 2024-12-10 PROCEDURE — 36573 INSJ PICC RS&I 5 YR+: CPT | Mod: LT

## 2024-12-10 PROCEDURE — 99232 SBSQ HOSP IP/OBS MODERATE 35: CPT

## 2024-12-10 PROCEDURE — 76937 US GUIDE VASCULAR ACCESS: CPT | Mod: 26,59

## 2024-12-10 PROCEDURE — 36000 PLACE NEEDLE IN VEIN: CPT | Mod: 59

## 2024-12-10 RX ORDER — SODIUM CHLORIDE 9 MG/ML
10 INJECTION, SOLUTION INTRAMUSCULAR; INTRAVENOUS; SUBCUTANEOUS
Refills: 0 | Status: DISCONTINUED | OUTPATIENT
Start: 2024-12-10 | End: 2024-12-11

## 2024-12-10 RX ORDER — NAFCILLIN 10 G/100ML
12 INJECTION, POWDER, FOR SOLUTION INTRAVENOUS
Qty: 22800 | Refills: 0
Start: 2024-12-10 | End: 2025-01-16

## 2024-12-10 RX ORDER — NAFCILLIN 10 G/100ML
2 INJECTION, POWDER, FOR SOLUTION INTRAVENOUS EVERY 4 HOURS
Refills: 0 | Status: DISCONTINUED | OUTPATIENT
Start: 2024-12-10 | End: 2024-12-11

## 2024-12-10 RX ORDER — DIPHENHYDRAMINE HCL 25 MG
50 CAPSULE ORAL ONCE
Refills: 0 | Status: DISCONTINUED | OUTPATIENT
Start: 2024-12-10 | End: 2024-12-11

## 2024-12-10 RX ORDER — METHYLPREDNISOLONE SOD SUCC 125 MG
125 VIAL (EA) INJECTION ONCE
Refills: 0 | Status: DISCONTINUED | OUTPATIENT
Start: 2024-12-10 | End: 2024-12-11

## 2024-12-10 RX ORDER — NAFCILLIN 10 G/100ML
2 INJECTION, POWDER, FOR SOLUTION INTRAVENOUS ONCE
Refills: 0 | Status: COMPLETED | OUTPATIENT
Start: 2024-12-10 | End: 2024-12-10

## 2024-12-10 RX ORDER — CHLORHEXIDINE GLUCONATE 1.2 MG/ML
1 RINSE ORAL
Refills: 0 | Status: DISCONTINUED | OUTPATIENT
Start: 2024-12-10 | End: 2024-12-11

## 2024-12-10 RX ORDER — AMOXICILLIN 250 MG
250 CAPSULE ORAL ONCE
Refills: 0 | Status: COMPLETED | OUTPATIENT
Start: 2024-12-10 | End: 2024-12-10

## 2024-12-10 RX ORDER — NAFCILLIN 10 G/100ML
INJECTION, POWDER, FOR SOLUTION INTRAVENOUS
Refills: 0 | Status: DISCONTINUED | OUTPATIENT
Start: 2024-12-10 | End: 2024-12-11

## 2024-12-10 RX ADMIN — CHLORHEXIDINE GLUCONATE 1 APPLICATION(S): 1.2 RINSE ORAL at 14:32

## 2024-12-10 RX ADMIN — NAFCILLIN 200 GRAM(S): 10 INJECTION, POWDER, FOR SOLUTION INTRAVENOUS at 18:06

## 2024-12-10 RX ADMIN — Medication 250 MILLIGRAM(S): at 09:47

## 2024-12-10 RX ADMIN — Medication 100 MILLIGRAM(S): at 05:43

## 2024-12-10 RX ADMIN — TAMSULOSIN HYDROCHLORIDE 0.8 MILLIGRAM(S): 0.4 CAPSULE ORAL at 21:45

## 2024-12-10 RX ADMIN — Medication 81 MILLIGRAM(S): at 05:41

## 2024-12-10 RX ADMIN — ACETAMINOPHEN, DIPHENHYDRAMINE HCL, PHENYLEPHRINE HCL 3 MILLIGRAM(S): 325; 25; 5 TABLET ORAL at 21:46

## 2024-12-10 RX ADMIN — Medication 81 MILLIGRAM(S): at 18:06

## 2024-12-10 RX ADMIN — NAFCILLIN 200 GRAM(S): 10 INJECTION, POWDER, FOR SOLUTION INTRAVENOUS at 22:23

## 2024-12-10 RX ADMIN — NAFCILLIN 200 GRAM(S): 10 INJECTION, POWDER, FOR SOLUTION INTRAVENOUS at 14:28

## 2024-12-10 RX ADMIN — Medication 400 MILLIGRAM(S): at 20:45

## 2024-12-10 RX ADMIN — Medication 400 MILLIGRAM(S): at 09:22

## 2024-12-10 RX ADMIN — Medication 400 MILLIGRAM(S): at 09:45

## 2024-12-10 NOTE — PROGRESS NOTE ADULT - ASSESSMENT
full note to follow    patient tolerated oral amoxacillin challenge  stop cefazolin  start nafcillin 2g q4hrs   when going home he will be on nafcillin 12g over 24hrs via a continuos pump   stop date Jan 16th  will need cbc w/diff, cmp, esr and crp to be sent to [email or web address] @[email or web address]    Discussed plan with primary team     Sushant Schwartz, DO  Chief, Infectious Disease at [facility]  Reachable via Microsoft Teams or ID office: [phone]  Weekdays: After 5pm, please call [phone] for all inquiries and new consults  Weekends: Message on-call infectious disease physician via teams (see Peter)   64yMale w/ c/o L knee incision drainage s/p L TKA on 11/4/24. Able to bear weight in the LLE since sxs began. Denies fevers/chills. Denies numbness/tingling in the LLE. Denies any recent trauma/injuries/sugery/injections. Denies hx of crystal arthopathy or other inflammatory joint disorders, IV drug use, new sexual encounters/STIs, or other infections. At baseline, community ambulator w/o assistive devices.  He was seen by Dr Newby in office as aspirated and the aspirate revealed about 50k WBC.   high wbc count on joint aspirate with PCR with MSSA (methicillin-susceptible Staphylococcus aureus)     MSSA (methicillin-susceptible Staphylococcus aureus) PJI  s/p washout and liner exchange     12/6: s/p I&D and poly exchange performed on 12/5, no fever, RA, no wbc, Cr ok, Tissues smear with no organisms seen, cultures are pending, BCs x 2 pending, Cefazolin IV continued.   Attending addendum:  continue cefazolin 2g q8hrs  awaiting for surgical cultures  awaiting for Blood cultures x 2  follow all cultures   pain control  urology consult   will potentially start rifampin if able to     12/9: drains removed, no fevers, RA, no wbc, Cr ok, BCs NGTD x 2, HIV negative, MRSA PCR negative, surgical cultures NGTD, office aspirate culture grew MSSA, pt is on IV Cefazolin, will add po rifampin for synergy, pt will need a picc line and six weeks of abx from the day of I&D.     12/10:  patient tolerated oral amoxacillin challenge  stop cefazolin  start nafcillin 2g q4hrs with last dose 1/16/25  when going home he will be on nafcillin 12g over 24hrs via a continuos pump   continue rifampin 600 mg daily po - last dose 1/16/2025  will need cbc w/diff, cmp, esr and crp to be sent to [email or web address]    Discussed plan with primary team     Sushant Schwartz DO  Chief, Infectious Disease at [facility]  Reachable via Microsoft Teams or ID office: [phone]  Weekdays: After 5pm, please call [phone] for all inquiries and new consults  Weekends: Message on-call infectious disease physician via teams (see Peter)

## 2024-12-10 NOTE — PROCEDURE NOTE - ADDITIONAL PROCEDURE DETAILS
Successful insertion of 4F 47cm single lumen PICC line via left basilic vein, using ultrasound and fluoroscopy guidance. Tip in SVC. Okay to use. No complications. Full report to follow.

## 2024-12-10 NOTE — PROGRESS NOTE ADULT - SUBJECTIVE AND OBJECTIVE BOX
[facility]  INFECTIOUS DISEASES   [address] [place] [address]  Tel: [phone]     Fax: [phone]  ==============================================================================  DO Genie Taylor MD Alexandra Gutman, NP   ==============================================================================      MEL BOWMAN  MRN-[id number]  64y ([birth year])      Interval History: patient seen and examined. patient tolerated oral amoxacillin challenge. will go home on nafcillin pump if can get approved     ROS:    [ ] Unobtainable because:  [X ] All other systems negative except as noted    Constitutional: no fever, no chills  Head: no trauma  Eyes: no vision changes, no eye pain  ENT:  no sore throat, no rhinorrhea  Cardiovascular:  no chest pain, no palpitation  Respiratory:  no SOB, no cough  GI:  no abd pain, no vomiting, no diarrhea  urinary: no dysuria, no hematuria, no flank pain  musculoskeletal:  no joint pain, no joint swelling  skin:  no rash  neurology:  no headache, no seizure, no change in mental status  psych: no anxiety, no depression         Allergies  amoxicillin (Rash)  azithromycin (Unknown)        ANTIMICROBIALS:  nafcillin  IVPB 2 every 4 hours  nafcillin  IVPB    rifAMPin 600 daily      OTHER MEDS:  acetaminophen     Tablet .. 975 milliGRAM(s) Oral every 8 hours  acetaminophen   IVPB .. 1000 milliGRAM(s) IV Intermittent once  aluminum hydroxide/magnesium hydroxide/simethicone Suspension 30 milliLiter(s) Oral every 6 hours PRN  aspirin enteric coated 81 milliGRAM(s) Oral two times a day  benzocaine/menthol Lozenge 1 Lozenge Oral every 4 hours PRN  chlorhexidine 2% Cloths 1 Application(s) Topical <User Schedule>  diphenhydrAMINE 25 milliGRAM(s) Oral every 4 hours PRN  diphenhydrAMINE Injectable 50 milliGRAM(s) IV Push once PRN  famotidine    Tablet 10 milliGRAM(s) Oral daily  ibuprofen  Tablet. 400 milliGRAM(s) Oral every 8 hours  magnesium hydroxide Suspension 30 milliLiter(s) Oral daily PRN  melatonin 3 milliGRAM(s) Oral at bedtime  methylPREDNISolone sodium succinate Injectable 125 milliGRAM(s) IV Push once PRN  ondansetron Injectable 4 milliGRAM(s) IV Push every 6 hours PRN  oxyCODONE    IR 5 milliGRAM(s) Oral every 4 hours PRN  oxyCODONE    IR 10 milliGRAM(s) Oral every 4 hours PRN  polyethylene glycol 3350 17 Gram(s) Oral at bedtime  povidone iodine 10% Nasal Swab 1 Application(s) Both Nostrils once  senna 2 Tablet(s) Oral at bedtime  sodium chloride 0.9% lock flush 10 milliLiter(s) IV Push every 1 hour PRN  tamsulosin 0.8 milliGRAM(s) Oral at bedtime  traMADol 50 milliGRAM(s) Oral every 6 hours PRN  tranexamic acid IVPB 1000 milliGRAM(s) IV Intermittent once      Physical Exam:  Vital Signs Last 24 Hrs  T(C): 37.2 (10 Dec 2024 17:20), Max: 37.2 (10 Dec 2024 17:20)  T(F): 98.9 (10 Dec 2024 17:20), Max: 98.9 (10 Dec 2024 17:20)  HR: 64 (10 Dec 2024 17:20) (64 - 75)  BP: 144/82 (10 Dec 2024 17:20) (144/76 - 164/83)  BP(mean): 103 (10 Dec 2024 10:15) (103 - 108)  RR: 18 (10 Dec 2024 17:20) (16 - 18)  SpO2: 96% (10 Dec 2024 17:20) (94% - 98%)    Parameters below as of 10 Dec 2024 17:20  Patient On (Oxygen Delivery Method): room air        General:    NAD,  non toxic  Head: atraumatic, normocephalic  Eye: normal sclera and conjunctiva  ENT:    no oral lesions, neck supple  Cardio:     regular S1, S2,  no murmur  Respiratory:    clear b/l,    no wheezing  abd:     soft,   BS +,   no tenderness  :   no CVAT,  no suprapubic tenderness,   no  pickett  Musculoskeletal:   knee with swelling and warm, suture site c/d/i  vascular: +picc line has been placed, +PIV   Skin:    no rash  Neurologic:     no focal deficit  psych: normal affect    WBC Count: 6.51 K/uL (12-10 @ 07:48)  WBC Count: 5.89 K/uL (12-09 @ 07:53)  WBC Count: 5.04 K/uL (12-08 @ 07:40)  WBC Count: 8.69 K/uL (12-06 @ 05:25)  WBC Count: 9.51 K/uL (12-05 @ 16:37)  WBC Count: 5.38 K/uL (12-05 @ 04:40)  WBC Count: 6.64 K/uL (12-04 @ 21:46)                            9.7    6.51  )-----------( 256      ( 10 Dec 2024 07:48 )             28.5       12-10    139  |  109[H]  |  18  ----------------------------<  104[H]  4.4   |  27  |  0.89    Ca    8.4[L]      10 Dec 2024 07:48        Urinalysis Basic - ( 10 Dec 2024 07:48 )    Color: x / Appearance: x / SG: x / pH: x  Gluc: 104 mg/dL / Ketone: x  / Bili: x / Urobili: x   Blood: x / Protein: x / Nitrite: x   Leuk Esterase: x / RBC: x / WBC x   Sq Epi: x / Non Sq Epi: x / Bacteria: x          Creatinine Trend: 0.89<--, 0.86<--, 0.80<--, 0.74<--, 0.77<--, 0.82<--      MICROBIOLOGY:  v  .Blood BLOOD  12-06-24   No growth at 4 days  --  --      Tissue  12-05-24   No growth  --    No polymorphonuclear cells seen per low power field  No organisms seen per oil power field      C-Reactive Protein: 80 (12-04)    RADIOLOGY:

## 2024-12-10 NOTE — PROGRESS NOTE ADULT - SUBJECTIVE AND OBJECTIVE BOX
Patient seen and examined at bedside. Patient reports pain well controlled on medications. No acute events overnight. Pt denies fevers, chills, new onset numbness, weakness or tingling in the extremities.    Vital Signs (24 Hrs):  T(C): 36.8 (12-10-24 @ 05:09), Max: 37.3 (12-09-24 @ 17:06)  HR: 69 (12-10-24 @ 05:09) (65 - 81)  BP: 157/88 (12-10-24 @ 05:09) (141/75 - 164/83)  RR: 18 (12-10-24 @ 05:09) (17 - 18)  SpO2: 98% (12-10-24 @ 05:09) (94% - 98%)  Wt(kg): --    LABS:                          10.3   5.89  )-----------( 255      ( 09 Dec 2024 07:53 )             29.8     12-09    139  |  109[H]  |  16  ----------------------------<  99  4.1   |  28  |  0.86    Ca    8.7      09 Dec 2024 07:53          Gen: Resting in bed, no acute distress  LLE  inc clean/dry/intact. 1 drain sewn in place.   Ching-incisional tenderness to palpation; otherwise, NTTP throughout the rest of the extremity.   SILT L2-S1.  GSC/TA/EHL/FHL intact. Q/H intact.   DP, PT pulse palpable.   No calf tenderness bilaterally.   Compartments soft and compressible.       Assessment and Plan:  64y Male s/p L knee I&D, poly exchange 12/5    Follow up am labs  Follow cultures  WBAT,   HMV x2 dc'd 12/7, 12/9  COLT dc'd 12/8  Pain management PRN  Antibiotics per ID, will need PICC- currently on rifampin and ancef. possible outpatient nafcillin pending amoxicillin test today  Benadryl PRN for rash/itching  DVT PPx: A81 BID  Incentive spirometry  Dispo: home per PT  Will discuss with Dr. Newby and advise of any changes to the plan.

## 2024-12-11 ENCOUNTER — TRANSCRIPTION ENCOUNTER (OUTPATIENT)
Age: 64
End: 2024-12-11

## 2024-12-11 VITALS
TEMPERATURE: 98 F | DIASTOLIC BLOOD PRESSURE: 78 MMHG | RESPIRATION RATE: 17 BRPM | OXYGEN SATURATION: 98 % | HEART RATE: 68 BPM | SYSTOLIC BLOOD PRESSURE: 137 MMHG

## 2024-12-11 LAB
ANION GAP SERPL CALC-SCNC: 5 MMOL/L — SIGNIFICANT CHANGE UP (ref 5–17)
BUN SERPL-MCNC: 15 MG/DL — SIGNIFICANT CHANGE UP (ref 7–23)
CALCIUM SERPL-MCNC: 8.7 MG/DL — SIGNIFICANT CHANGE UP (ref 8.5–10.1)
CHLORIDE SERPL-SCNC: 110 MMOL/L — HIGH (ref 96–108)
CO2 SERPL-SCNC: 26 MMOL/L — SIGNIFICANT CHANGE UP (ref 22–31)
CREAT SERPL-MCNC: 0.96 MG/DL — SIGNIFICANT CHANGE UP (ref 0.5–1.3)
CULTURE RESULTS: SIGNIFICANT CHANGE UP
CULTURE RESULTS: SIGNIFICANT CHANGE UP
EGFR: 88 ML/MIN/1.73M2 — SIGNIFICANT CHANGE UP
GLUCOSE SERPL-MCNC: 105 MG/DL — HIGH (ref 70–99)
HCT VFR BLD CALC: 27.9 % — LOW (ref 39–50)
HGB BLD-MCNC: 9.6 G/DL — LOW (ref 13–17)
MCHC RBC-ENTMCNC: 28.8 PG — SIGNIFICANT CHANGE UP (ref 27–34)
MCHC RBC-ENTMCNC: 34.4 G/DL — SIGNIFICANT CHANGE UP (ref 32–36)
MCV RBC AUTO: 83.8 FL — SIGNIFICANT CHANGE UP (ref 80–100)
NRBC # BLD: 0 /100 WBCS — SIGNIFICANT CHANGE UP (ref 0–0)
PLATELET # BLD AUTO: 244 K/UL — SIGNIFICANT CHANGE UP (ref 150–400)
POTASSIUM SERPL-MCNC: 4.1 MMOL/L — SIGNIFICANT CHANGE UP (ref 3.5–5.3)
POTASSIUM SERPL-SCNC: 4.1 MMOL/L — SIGNIFICANT CHANGE UP (ref 3.5–5.3)
RBC # BLD: 3.33 M/UL — LOW (ref 4.2–5.8)
RBC # FLD: 13.2 % — SIGNIFICANT CHANGE UP (ref 10.3–14.5)
SODIUM SERPL-SCNC: 141 MMOL/L — SIGNIFICANT CHANGE UP (ref 135–145)
SPECIMEN SOURCE: SIGNIFICANT CHANGE UP
SPECIMEN SOURCE: SIGNIFICANT CHANGE UP
WBC # BLD: 4.67 K/UL — SIGNIFICANT CHANGE UP (ref 3.8–10.5)
WBC # FLD AUTO: 4.67 K/UL — SIGNIFICANT CHANGE UP (ref 3.8–10.5)

## 2024-12-11 RX ORDER — TRAMADOL HYDROCHLORIDE 300 MG/1
1 CAPSULE ORAL
Qty: 20 | Refills: 0
Start: 2024-12-11

## 2024-12-11 RX ORDER — RIFAMPIN 150 MG/1
2 CAPSULE ORAL
Qty: 60 | Refills: 0
Start: 2024-12-11

## 2024-12-11 RX ORDER — NAFCILLIN 10 G/100ML
12 INJECTION, POWDER, FOR SOLUTION INTRAVENOUS
Qty: 0 | Refills: 0 | DISCHARGE
Start: 2024-12-11

## 2024-12-11 RX ORDER — TAMSULOSIN HYDROCHLORIDE 0.4 MG/1
2 CAPSULE ORAL
Qty: 0 | Refills: 0 | DISCHARGE
Start: 2024-12-11

## 2024-12-11 RX ORDER — ONDANSETRON HYDROCHLORIDE 4 MG/1
1 TABLET, FILM COATED ORAL
Qty: 20 | Refills: 0
Start: 2024-12-11

## 2024-12-11 RX ORDER — IBUPROFEN 200 MG
1 TABLET ORAL
Qty: 0 | Refills: 0 | DISCHARGE
Start: 2024-12-11

## 2024-12-11 RX ORDER — DOCUSATE SODIUM 100 MG
1 CAPSULE ORAL
Qty: 20 | Refills: 0
Start: 2024-12-11

## 2024-12-11 RX ORDER — DAPTOMYCIN 500 MG/10ML
650 INJECTION, POWDER, LYOPHILIZED, FOR SOLUTION INTRAVENOUS ONCE
Refills: 0 | Status: COMPLETED | OUTPATIENT
Start: 2024-12-11 | End: 2024-12-11

## 2024-12-11 RX ADMIN — Medication 400 MILLIGRAM(S): at 12:00

## 2024-12-11 RX ADMIN — Medication 400 MILLIGRAM(S): at 02:41

## 2024-12-11 RX ADMIN — NAFCILLIN 200 GRAM(S): 10 INJECTION, POWDER, FOR SOLUTION INTRAVENOUS at 06:22

## 2024-12-11 RX ADMIN — Medication 81 MILLIGRAM(S): at 06:25

## 2024-12-11 RX ADMIN — NAFCILLIN 200 GRAM(S): 10 INJECTION, POWDER, FOR SOLUTION INTRAVENOUS at 02:42

## 2024-12-11 RX ADMIN — NAFCILLIN 200 GRAM(S): 10 INJECTION, POWDER, FOR SOLUTION INTRAVENOUS at 14:20

## 2024-12-11 RX ADMIN — DAPTOMYCIN 126 MILLIGRAM(S): 500 INJECTION, POWDER, LYOPHILIZED, FOR SOLUTION INTRAVENOUS at 16:26

## 2024-12-11 RX ADMIN — NAFCILLIN 200 GRAM(S): 10 INJECTION, POWDER, FOR SOLUTION INTRAVENOUS at 10:24

## 2024-12-11 RX ADMIN — Medication 400 MILLIGRAM(S): at 12:18

## 2024-12-11 NOTE — PROGRESS NOTE ADULT - PROVIDER SPECIALTY LIST ADULT
Infectious Disease
Orthopedics
Infectious Disease
Orthopedics
Infectious Disease
Urology

## 2024-12-11 NOTE — DISCHARGE NOTE NURSING/CASE MANAGEMENT/SOCIAL WORK - FINANCIAL ASSISTANCE
[facility] provides services at a reduced cost to those who are determined to be eligible through [facility]’s financial assistance program. Information regarding [facility]’s financial assistance program can be found by going to [email or web address] or by calling [phone].

## 2024-12-11 NOTE — CHART NOTE - NSCHARTNOTEFT_GEN_A_CORE
Patient seen for length of stay nutrition risk rescreening. Patient has been screened for and presents negative for    -Pressure ulcers stage 2 or greater  -Enteral or Parenteral Nutrition  -BMI <18  -KpuoqyvuhkM9H >8  -Chewing/Swallowing Difficulty  -Decreased appetite  -Unintentional Weight Loss  -Inadequate diet (i.e. NPO/Clear Liquids)    Pt reports good appetite and good PO intake PTA. No dietary restrictions at home. Continues with good appetite; PO intake mostly %. Denies any chew/swallowing difficulty or any N/V/D/C. Wt stable at ~170 lbs. No intervention required at this time. RD remains available.

## 2024-12-11 NOTE — DISCHARGE NOTE NURSING/CASE MANAGEMENT/SOCIAL WORK - NSDCPEFALRISK_GEN_ALL_CORE
For information on Fall & Injury Prevention, visit: [email or web address] [place]  [email or web address] [place]  https://www.cdc.gov/steadi/patient.html

## 2024-12-11 NOTE — DISCHARGE NOTE NURSING/CASE MANAGEMENT/SOCIAL WORK - PATIENT PORTAL LINK FT
You can access the FollowMyHealth Patient Portal offered by [facility] by registering at the following website: [email or web address]. By joining [employer]’s FollowMyHealth portal, you will also be able to view your health information using other applications (apps) compatible with our system.

## 2024-12-11 NOTE — PROGRESS NOTE ADULT - REASON FOR ADMISSION
R knee washout

## 2024-12-11 NOTE — PROGRESS NOTE ADULT - SUBJECTIVE AND OBJECTIVE BOX
Patient seen and examined at bedside. Patient reports pain well controlled on medications. No acute events overnight. Pt denies fevers, chills, new onset numbness, weakness or tingling in the extremities.    Vital Signs (24 Hrs):  T(C): 36.8 (12-11-24 @ 05:21), Max: 37.2 (12-10-24 @ 17:20)  HR: 59 (12-11-24 @ 05:21) (59 - 75)  BP: 139/81 (12-11-24 @ 05:21) (139/81 - 154/82)  RR: 17 (12-11-24 @ 05:21) (16 - 18)  SpO2: 96% (12-11-24 @ 05:21) (96% - 98%)  Wt(kg): --    LABS:                          9.7    6.51  )-----------( 256      ( 10 Dec 2024 07:48 )             28.5     12-10    139  |  109[H]  |  18  ----------------------------<  104[H]  4.4   |  27  |  0.89    Ca    8.4[L]      10 Dec 2024 07:48                  Gen: Resting in bed, no acute distress  LLE  inc clean/dry/intact  Ching-incisional tenderness to palpation; otherwise, NTTP throughout the rest of the extremity.   SILT L2-S1.  GSC/TA/EHL/FHL intact. Q/H intact.   DP, PT pulse palpable.   No calf tenderness bilaterally.   Compartments soft and compressible.       Assessment and Plan:  64y Male s/p L knee I&D, poly exchange 12/5    Follow up am labs  Follow cultures  WBAT,   HMV x2 dc'd 12/7, 12/9  COLT dc'd 12/8  Pain management PRN  Antibiotics per ID, will need PICC- currently on rifampin and nafcillin.  Benadryl PRN for rash/itching  DVT PPx: A81 BID  Incentive spirometry  Dispo: home per PT. hopefully today   Ortho stable for DC  Will discuss with Dr. Newby and advise of any changes to the plan.

## 2024-12-12 RX ORDER — TAMSULOSIN HYDROCHLORIDE 0.4 MG/1
2 CAPSULE ORAL
Qty: 1 | Refills: 0
Start: 2024-12-12

## 2024-12-12 RX ORDER — TAMSULOSIN HYDROCHLORIDE 0.4 MG/1
2 CAPSULE ORAL
Qty: 70 | Refills: 0
Start: 2024-12-12 | End: 2025-01-15

## 2024-12-12 RX ORDER — RIFAMPIN 150 MG/1
2 CAPSULE ORAL
Qty: 70 | Refills: 0
Start: 2024-12-12 | End: 2025-01-15

## 2024-12-18 DIAGNOSIS — N40.1 BENIGN PROSTATIC HYPERPLASIA WITH LOWER URINARY TRACT SYMPTOMS: ICD-10-CM
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
## 2024-12-18 DIAGNOSIS — Z79.82 LONG TERM (CURRENT) USE OF ASPIRIN: ICD-10-CM

## 2024-12-18 DIAGNOSIS — R33.8 OTHER RETENTION OF URINE: ICD-10-CM

## 2024-12-18 DIAGNOSIS — Z88.0 ALLERGY STATUS TO PENICILLIN: ICD-10-CM

## 2024-12-19 LAB
CULTURE RESULTS: NO GROWTH — SIGNIFICANT CHANGE UP
GRAM STN FLD: SIGNIFICANT CHANGE UP
SPECIMEN SOURCE: SIGNIFICANT CHANGE UP

## 2024-12-20 ENCOUNTER — APPOINTMENT (OUTPATIENT)
Dept: ORTHOPEDIC SURGERY | Facility: CLINIC | Age: 64
End: 2024-12-20
Payer: MEDICAID

## 2024-12-20 DIAGNOSIS — Z96.652 PRESENCE OF LEFT ARTIFICIAL KNEE JOINT: ICD-10-CM

## 2024-12-20 PROCEDURE — 99024 POSTOP FOLLOW-UP VISIT: CPT

## 2024-12-23 ENCOUNTER — NON-APPOINTMENT (OUTPATIENT)
Age: 64
End: 2024-12-23

## 2025-01-15 ENCOUNTER — APPOINTMENT (OUTPATIENT)
Dept: ORTHOPEDIC SURGERY | Facility: CLINIC | Age: 65
End: 2025-01-15
Payer: MEDICAID

## 2025-01-15 VITALS — WEIGHT: 164 LBS | BODY MASS INDEX: 24.86 KG/M2 | HEIGHT: 68 IN

## 2025-01-15 DIAGNOSIS — Z96.652 PRESENCE OF LEFT ARTIFICIAL KNEE JOINT: ICD-10-CM

## 2025-01-15 DIAGNOSIS — T84.50XA INFECTION AND INFLAMMATORY REACTION DUE TO UNSPECIFIED INTERNAL JOINT PROSTHESIS, INITIAL ENCOUNTER: ICD-10-CM

## 2025-01-15 DIAGNOSIS — M17.0 BILATERAL PRIMARY OSTEOARTHRITIS OF KNEE: ICD-10-CM

## 2025-01-15 PROCEDURE — 73562 X-RAY EXAM OF KNEE 3: CPT | Mod: LT

## 2025-01-15 PROCEDURE — 99024 POSTOP FOLLOW-UP VISIT: CPT

## 2025-01-15 RX ORDER — RIFAMPIN 300 MG/1
300 CAPSULE ORAL TWICE DAILY
Qty: 60 | Refills: 5 | Status: ACTIVE | COMMUNITY
Start: 2025-01-15 | End: 1900-01-01

## 2025-01-15 RX ORDER — CEPHALEXIN 500 MG/1
500 TABLET ORAL
Qty: 90 | Refills: 5 | Status: ACTIVE | COMMUNITY
Start: 2025-01-15 | End: 1900-01-01

## 2025-02-03 ENCOUNTER — NON-APPOINTMENT (OUTPATIENT)
Age: 65
End: 2025-02-03

## 2025-03-10 NOTE — DISCHARGE NOTE NURSING/CASE MANAGEMENT/SOCIAL WORK - NSDCVIVACCINE_GEN_ALL_CORE_FT
Preop Patient Instructions for [facility] Surgeries and Procedures    Welcome! We want you to have the best experience! Thank you for choosing us and trusting us with your care. If you have any questions regarding your preop instructions, please call:  [phone] 7:30am-4pm M-F  IF you have questions day before surgery before 8:30pm or morning of surgery after 5am THEN call [phone]   Call your surgeon immediately if you feel that you cannot make it for surgery for any reason (i.e. cold symptoms, fever, family emergency etc.).    WHEN SHOULD I ARRIVE?    Your hospital arrival time will be given the afternoon of the business day prior to your surgical date, via text message or phone call after 4:00 PM. Once you accept the text, we will know you received your arrival time. If your surgery falls the day after a holiday or on a Monday, you will be notified the prior business day.     WHERE DO I GO?    When driving, follow the signs to the Main Entrance, and then follow signs for Parking lot A, which is located to the right of the Main Entrance if you are facing the building.   is available M-F 8-4pm. Walk through the main entrance on the ground level. Then, walk to the East elevators, which are located past the front lobby desk on the right. The [facility] is located up one level on the first floor. Exit the elevator and the reception desk is to your right.    TRANSPORTATION    IMPORTANT SAFETY! You must have a ride arranged to get home and have a responsible adult stay with you for 24 hours after the surgery. You cannot drive or use public transportation or rideshare by yourself.    CAN I HAVE VISITORS?    Yes, visitors are allowed. No one under the age of 12.  You may be asked to mask in certain areas, speak with staff on arrival for the most current details.  The clinical team has discretion to limit visitors if a visitor presents a health or safety risk to the patient, themselves, or  the care team.   Visitor accommodations are subject to change depending on community infection concerns.      WHAT SHOULD I BRING?    Photo ID and insurance card  Do not bring valuables to the hospital  Bring any asthma/COPD inhalers, eyeglasses, dentures or hearing aids (bring cases)  Home medication list    IF you are staying overnight bring:  CPAP machine - if you use one for sleep apnea.  Self-care items such as hairbrush or hair tie.    HOW TO DRESS, SHOWER OR BATHE THE NIGHT BEFORE SURGERY?    CHG Instructions  Before surgery patients take an important role in prevention of a surgical site infection by using a special wash. A preoperative shower or bath with a special soap called chlorhexidine gluconate (CHG) 4% will need to be done. A common name for this soap is Hibiclens or Aplicare, but any of the mentioned brands are acceptable for use. If there is an allergy to CHG or for any other reason that washing with CHG is not possible, please follow the instructions below BUT use an antibacterial soap.    Patient Instructions for Skin Cleaning for baths or showers:   Please read the \"Drug Facts\" for CHG information and directions on the bottle:   Do not use on the head or face, eyes, ears or mouth.   Do not use in the genital area.   Do not use directly on stoma for ostomy.  Do not use if allergic to chlorhexidine gluconate or any other Ingredient listed. Instead use an antibacterial soap.    Steps for a Bath or Shower the Night Before and Morning of Surgery:  Please remove any nail polish including toes.  When bathing or showering wash hair as usual with regular shampoo.  Rinse hair and body thoroughly to remove any shampoo residue.   Wash face with regular soap or water only.   Wash genital area with regular soap or water only, NOT with CHG.   Thoroughly rinse body with warm water from the neck down.   Turn off the water to prevent rinsing the CHG soap off too soon.   Apply CHG soap and leave on for one minute.  Pay special attention to the area of surgery.   Avoid any open skin areas including open wounds. Your physician should be made aware of these wounds and provide instructions for them.  If having back surgery, please have someone assist in applying CHG to the back area.   Rinse thoroughly with warm water.   Do not use regular soap after applying and rinsing CHG.   Pat dry with a fresh towel.   Do not apply lotion, powders or perfumes.   Do not wear jewelry, this also applies to permanent jewelry. If it is not removed it could result in a burn, or the cancellation of your surgery.   Do not wear makeup or false lashes, including mascara.  Put on clean clothes and sleep in fresh bed sheets.   Sleeping with pet animals can be a source of infection.   *If a cast or splint is in place that is not to be removed, then sponge bathe exposed skin areas from the neck down.  Please brush and floss the night before and morning of your procedure with a new toothbrush.    WHAT CAN I EAT BEFORE I ARRIVE FOR SURGERY?    8 hours prior to ARRIVAL time: Only if allowed by your surgeon, you can eat a full meal. No solid foods after this.     2 hours before ARRIVAL time: You may drink approved clear liquids up to 2 hours before your arrival time.  If your arrival time is before 6am, finish your water by 5am.    Approved Clear liquids: Water, Propel, Gatorade (any color except red), Apple juice without pulp (non-organic); Pedialyte, 7-up/ Sprite, PLAIN Black coffee or tea without milk products or lemon. NO NON-DAIRY CREAMERS *If you have diabetes choose low carb/sugar or no carb/sugar options.     Unacceptable Clear liquids: Coffee or tea with milk products, orange juice, alcohol, soup broth, powder flavoring packets.     IMPORTANT SAFETY! FAILURE TO FOLLOW THIS MAY RESULT IN EITHER DELAY OR CANCELLATION OF YOUR PROCEDURE DUE TO THE RISK OF ASPIRATION.    WHEN SHOULD I STOP TAKING MY MEDICATIONS?    Before Surgery Hold (Do Not Take) these  Medications    Medications and Supplements:  - Morning of surgery hold any Vitamins AND for 2 weeks prior hold any Vitamin E or Herbals     Continue all other medications unless an alternative plan was advised with the surgeon and/or specialist.   No Vaccines Administered.

## 2025-03-13 ENCOUNTER — NON-APPOINTMENT (OUTPATIENT)
Age: 65
End: 2025-03-13

## 2025-03-13 ENCOUNTER — APPOINTMENT (OUTPATIENT)
Dept: INFECTIOUS DISEASE | Facility: CLINIC | Age: 65
End: 2025-03-13

## 2025-03-13 ENCOUNTER — OUTPATIENT (OUTPATIENT)
Dept: OUTPATIENT SERVICES | Facility: HOSPITAL | Age: 65
LOS: 1 days | End: 2025-03-13
Payer: MEDICAID

## 2025-03-13 VITALS — DIASTOLIC BLOOD PRESSURE: 88 MMHG | SYSTOLIC BLOOD PRESSURE: 154 MMHG

## 2025-03-13 VITALS
SYSTOLIC BLOOD PRESSURE: 168 MMHG | OXYGEN SATURATION: 97 % | HEART RATE: 63 BPM | HEIGHT: 68 IN | TEMPERATURE: 98.5 F | BODY MASS INDEX: 26.07 KG/M2 | DIASTOLIC BLOOD PRESSURE: 90 MMHG | WEIGHT: 172 LBS

## 2025-03-13 DIAGNOSIS — T84.50XA INFECTION AND INFLAMMATORY REACTION DUE TO UNSPECIFIED INTERNAL JOINT PROSTHESIS, INITIAL ENCOUNTER: ICD-10-CM

## 2025-03-13 DIAGNOSIS — Z98.890 OTHER SPECIFIED POSTPROCEDURAL STATES: Chronic | ICD-10-CM

## 2025-03-13 DIAGNOSIS — A49.01 METHICILLIN SUSCEPTIBLE STAPHYLOCOCCUS AUREUS INFECTION, UNSPECIFIED SITE: ICD-10-CM

## 2025-03-13 DIAGNOSIS — N40.0 BENIGN PROSTATIC HYPERPLASIA WITHOUT LOWER URINARY TRACT SYMPTOMS: ICD-10-CM

## 2025-03-13 DIAGNOSIS — B97.89 OTHER VIRAL AGENTS AS THE CAUSE OF DISEASES CLASSIFIED ELSEWHERE: ICD-10-CM

## 2025-03-13 PROCEDURE — G0463: CPT

## 2025-03-13 PROCEDURE — 99213 OFFICE O/P EST LOW 20 MIN: CPT

## 2025-03-19 NOTE — OCCUPATIONAL THERAPY INITIAL EVALUATION ADULT - LUE MMT, REHAB EVAL
Please follow up with your primary care doctor:  Yovana Omer MD   [address] [address] / [facility] [address]   [phone]      Grossly./no strength deficits were identified

## 2025-03-20 LAB
ALBUMIN SERPL ELPH-MCNC: 4.4 G/DL
ALP BLD-CCNC: 70 U/L
ALT SERPL-CCNC: 27 U/L
ANION GAP SERPL CALC-SCNC: 11 MMOL/L
AST SERPL-CCNC: 23 U/L
BASOPHILS # BLD AUTO: 0.02 K/UL
BASOPHILS NFR BLD AUTO: 0.4 %
BILIRUB SERPL-MCNC: 0.3 MG/DL
BUN SERPL-MCNC: 22 MG/DL
CALCIUM SERPL-MCNC: 9.6 MG/DL
CHLORIDE SERPL-SCNC: 104 MMOL/L
CO2 SERPL-SCNC: 25 MMOL/L
CREAT SERPL-MCNC: 0.95 MG/DL
CRP SERPL-MCNC: <3 MG/L
EGFRCR SERPLBLD CKD-EPI 2021: 89 ML/MIN/1.73M2
EOSINOPHIL # BLD AUTO: 0.16 K/UL
EOSINOPHIL NFR BLD AUTO: 3.5 %
ERYTHROCYTE [SEDIMENTATION RATE] IN BLOOD BY WESTERGREN METHOD: 7 MM/HR
GLUCOSE SERPL-MCNC: 75 MG/DL
HCT VFR BLD CALC: 50.3 %
HGB BLD-MCNC: 17.2 G/DL
IMM GRANULOCYTES NFR BLD AUTO: 0.4 %
LYMPHOCYTES # BLD AUTO: 1.03 K/UL
LYMPHOCYTES NFR BLD AUTO: 22.8 %
MAN DIFF?: NORMAL
MCHC RBC-ENTMCNC: 27.9 PG
MCHC RBC-ENTMCNC: 34.2 G/DL
MCV RBC AUTO: 81.7 FL
MONOCYTES # BLD AUTO: 0.64 K/UL
MONOCYTES NFR BLD AUTO: 14.2 %
NEUTROPHILS # BLD AUTO: 2.64 K/UL
NEUTROPHILS NFR BLD AUTO: 58.7 %
PLATELET # BLD AUTO: 141 K/UL
POTASSIUM SERPL-SCNC: 4.8 MMOL/L
PROT SERPL-MCNC: 7 G/DL
RBC # BLD: 6.16 M/UL
RBC # FLD: 14.8 %
SODIUM SERPL-SCNC: 140 MMOL/L
WBC # FLD AUTO: 4.51 K/UL

## 2025-03-28 ENCOUNTER — APPOINTMENT (OUTPATIENT)
Dept: ORTHOPEDIC SURGERY | Facility: CLINIC | Age: 65
End: 2025-03-28
Payer: MEDICAID

## 2025-03-28 VITALS — BODY MASS INDEX: 26.07 KG/M2 | HEIGHT: 68 IN | WEIGHT: 172 LBS

## 2025-03-28 DIAGNOSIS — Z96.652 PRESENCE OF LEFT ARTIFICIAL KNEE JOINT: ICD-10-CM

## 2025-03-28 DIAGNOSIS — M17.0 BILATERAL PRIMARY OSTEOARTHRITIS OF KNEE: ICD-10-CM

## 2025-03-28 PROCEDURE — 99213 OFFICE O/P EST LOW 20 MIN: CPT

## 2025-04-04 ENCOUNTER — NON-APPOINTMENT (OUTPATIENT)
Age: 65
End: 2025-04-04

## 2025-05-15 ENCOUNTER — OUTPATIENT (OUTPATIENT)
Dept: OUTPATIENT SERVICES | Facility: HOSPITAL | Age: 65
LOS: 1 days | End: 2025-05-15

## 2025-05-15 ENCOUNTER — LABORATORY RESULT (OUTPATIENT)
Age: 65
End: 2025-05-15

## 2025-05-15 ENCOUNTER — APPOINTMENT (OUTPATIENT)
Dept: INFECTIOUS DISEASE | Facility: CLINIC | Age: 65
End: 2025-05-15

## 2025-05-15 VITALS
BODY MASS INDEX: 25.76 KG/M2 | TEMPERATURE: 98.5 F | OXYGEN SATURATION: 97 % | HEIGHT: 68 IN | WEIGHT: 170 LBS | DIASTOLIC BLOOD PRESSURE: 93 MMHG | SYSTOLIC BLOOD PRESSURE: 154 MMHG | HEART RATE: 60 BPM

## 2025-05-15 DIAGNOSIS — T84.50XA INFECTION AND INFLAMMATORY REACTION DUE TO UNSPECIFIED INTERNAL JOINT PROSTHESIS, INITIAL ENCOUNTER: ICD-10-CM

## 2025-05-15 DIAGNOSIS — Z98.890 OTHER SPECIFIED POSTPROCEDURAL STATES: Chronic | ICD-10-CM

## 2025-05-15 DIAGNOSIS — B97.89 OTHER VIRAL AGENTS AS THE CAUSE OF DISEASES CLASSIFIED ELSEWHERE: ICD-10-CM

## 2025-05-15 PROCEDURE — 99213 OFFICE O/P EST LOW 20 MIN: CPT

## 2025-05-15 PROCEDURE — 93005 ELECTROCARDIOGRAM TRACING: CPT

## 2025-05-15 PROCEDURE — G0463: CPT

## 2025-05-16 ENCOUNTER — TRANSCRIPTION ENCOUNTER (OUTPATIENT)
Age: 65
End: 2025-05-16

## 2025-05-28 ENCOUNTER — APPOINTMENT (OUTPATIENT)
Dept: ORTHOPEDIC SURGERY | Facility: CLINIC | Age: 65
End: 2025-05-28
Payer: MEDICAID

## 2025-05-28 DIAGNOSIS — Z96.652 PRESENCE OF LEFT ARTIFICIAL KNEE JOINT: ICD-10-CM

## 2025-05-28 DIAGNOSIS — M17.0 BILATERAL PRIMARY OSTEOARTHRITIS OF KNEE: ICD-10-CM

## 2025-05-28 PROCEDURE — 99213 OFFICE O/P EST LOW 20 MIN: CPT

## 2025-05-28 PROCEDURE — 73562 X-RAY EXAM OF KNEE 3: CPT | Mod: LT

## 2025-06-16 PROBLEM — R42 VERTIGO: Status: ACTIVE | Noted: 2025-06-16

## 2025-08-07 ENCOUNTER — LABORATORY RESULT (OUTPATIENT)
Age: 65
End: 2025-08-07

## 2025-08-07 ENCOUNTER — OUTPATIENT (OUTPATIENT)
Dept: OUTPATIENT SERVICES | Facility: HOSPITAL | Age: 65
LOS: 1 days | End: 2025-08-07
Payer: MEDICAID

## 2025-08-07 ENCOUNTER — APPOINTMENT (OUTPATIENT)
Dept: INFECTIOUS DISEASE | Facility: CLINIC | Age: 65
End: 2025-08-07

## 2025-08-07 VITALS
HEIGHT: 68 IN | OXYGEN SATURATION: 98 % | HEART RATE: 71 BPM | TEMPERATURE: 98.2 F | SYSTOLIC BLOOD PRESSURE: 139 MMHG | WEIGHT: 175 LBS | DIASTOLIC BLOOD PRESSURE: 84 MMHG | BODY MASS INDEX: 26.52 KG/M2

## 2025-08-07 DIAGNOSIS — Z96.652 PRESENCE OF LEFT ARTIFICIAL KNEE JOINT: ICD-10-CM

## 2025-08-07 DIAGNOSIS — B97.89 OTHER VIRAL AGENTS AS THE CAUSE OF DISEASES CLASSIFIED ELSEWHERE: ICD-10-CM

## 2025-08-07 DIAGNOSIS — Z98.890 OTHER SPECIFIED POSTPROCEDURAL STATES: Chronic | ICD-10-CM

## 2025-08-07 DIAGNOSIS — D69.6 THROMBOCYTOPENIA, UNSPECIFIED: ICD-10-CM

## 2025-08-07 DIAGNOSIS — T84.50XA INFECTION AND INFLAMMATORY REACTION DUE TO UNSPECIFIED INTERNAL JOINT PROSTHESIS, INITIAL ENCOUNTER: ICD-10-CM

## 2025-08-07 LAB
CLOSURE TME COLL+EPINEP BLD: SIGNIFICANT CHANGE UP (ref 150–400)
CRP SERPL-MCNC: <3 MG/L — SIGNIFICANT CHANGE UP
HCT VFR BLD CALC: 48.5 % — SIGNIFICANT CHANGE UP (ref 39–50)
HGB BLD-MCNC: 17.2 G/DL — HIGH (ref 13–17)
MCHC RBC-ENTMCNC: 30.1 PG — SIGNIFICANT CHANGE UP (ref 27–34)
MCHC RBC-ENTMCNC: 35.5 G/DL — SIGNIFICANT CHANGE UP (ref 32–36)
MCV RBC AUTO: 84.8 FL — SIGNIFICANT CHANGE UP (ref 80–100)
PLATELET # BLD AUTO: 126 K/UL — LOW (ref 150–400)
RBC # BLD: 5.72 M/UL — SIGNIFICANT CHANGE UP (ref 4.2–5.8)
RBC # FLD: 13 % — SIGNIFICANT CHANGE UP (ref 10.3–14.5)
WBC # BLD: 4.38 K/UL — SIGNIFICANT CHANGE UP (ref 3.8–10.5)
WBC # FLD AUTO: 4.38 K/UL — SIGNIFICANT CHANGE UP (ref 3.8–10.5)

## 2025-08-07 PROCEDURE — G0463: CPT

## 2025-08-07 PROCEDURE — 85027 COMPLETE CBC AUTOMATED: CPT

## 2025-08-07 PROCEDURE — 86140 C-REACTIVE PROTEIN: CPT

## 2025-08-07 PROCEDURE — 99213 OFFICE O/P EST LOW 20 MIN: CPT

## 2025-08-07 PROCEDURE — 85049 AUTOMATED PLATELET COUNT: CPT

## 2025-08-07 PROCEDURE — 36415 COLL VENOUS BLD VENIPUNCTURE: CPT

## 2025-08-21 DIAGNOSIS — Z96.652 PRESENCE OF LEFT ARTIFICIAL KNEE JOINT: ICD-10-CM

## 2025-08-21 DIAGNOSIS — D69.6 THROMBOCYTOPENIA, UNSPECIFIED: ICD-10-CM

## 2025-08-21 DIAGNOSIS — T84.50XA INFECTION AND INFLAMMATORY REACTION DUE TO UNSPECIFIED INTERNAL JOINT PROSTHESIS, INITIAL ENCOUNTER: ICD-10-CM

## (undated) DEVICE — STRYKER MIXEVAC 3 BONE CEMENT MIXER

## (undated) DEVICE — SAW BLADE STRYKER SAGITTAL DUAL CUT 25X90X1.27MM

## (undated) DEVICE — POSITIONER FOAM BUMP FLAT TOP 10X6X4" LRG

## (undated) DEVICE — DRSG AQUACEL 3.5 X 10"

## (undated) DEVICE — SUT STRATAFIX SYMMETRIC PDS PLUS 1 18" CTX VIOLET

## (undated) DEVICE — SUT NDL MAYO CATGUT 1/2 CIRCLE TAPER POINT 0.050" X 1.248"

## (undated) DEVICE — STAPLER SKIN MULTI DIRECTION W35

## (undated) DEVICE — BRACE KNEE IMMOBILIZER SPLINT SUPER LARGE 24"

## (undated) DEVICE — TOURNIQUET ESMARK 6"

## (undated) DEVICE — ZIMMER PULSAVAC PLUS FAN KIT

## (undated) DEVICE — GLV 7.5 PROTEXIS (WHITE)

## (undated) DEVICE — DRSG MASTISOL

## (undated) DEVICE — SUT FIBERWIRE #2 38" STRAND 1 BLUE T-5 TAPER

## (undated) DEVICE — FRA-ESU BOVIE FORCE FX F3B25828A: Type: DURABLE MEDICAL EQUIPMENT

## (undated) DEVICE — DRSG STERISTRIPS 0.5 X 4"

## (undated) DEVICE — SAW BLADE STRYKER RECIPROCATING 77.6X0.77X11.2MM

## (undated) DEVICE — SUT HISTOACRYL BLUE

## (undated) DEVICE — PACK ORTHO

## (undated) DEVICE — SUT VICRYL 2-0 27" CT-2 UNDYED

## (undated) DEVICE — SUT MONOCRYL 3-0 27" PS-2 UNDYED

## (undated) DEVICE — SYR LUER LOK 50CC

## (undated) DEVICE — WARMING BLANKET LOWER ADULT

## (undated) DEVICE — VENODYNE/SCD SLEEVE CALF MEDIUM

## (undated) DEVICE — PREP CHLORAPREP HI-LITE ORANGE 26ML

## (undated) DEVICE — DRSG PICO NPWT 4X12"

## (undated) DEVICE — DRSG COBAN 6"

## (undated) DEVICE — SUCTION YANKAUER TAPERED BULBOUS NO VENT

## (undated) DEVICE — DRAPE 3/4 SHEET W REINFORCEMENT 56X77"

## (undated) DEVICE — SUT VICRYL 0 27" CT-1 UNDYED

## (undated) DEVICE — DRAPE C ARM 41X140"

## (undated) DEVICE — GLV 8 PROTEXIS (WHITE)

## (undated) DEVICE — Device

## (undated) DEVICE — DRAPE EXTREMITY 87" X 128.5"

## (undated) DEVICE — SUT VICRYL 1 36" CT-1 UNDYED

## (undated) DEVICE — PACK BASIC

## (undated) DEVICE — FRA-ESU BOVIE FORCE TRIAD T7J19731DX: Type: DURABLE MEDICAL EQUIPMENT

## (undated) DEVICE — HOOD T5 PEELAWAY

## (undated) DEVICE — FRA-TOURNIQUET 402010120020: Type: DURABLE MEDICAL EQUIPMENT

## (undated) DEVICE — HOOD FLYTE STRYKER HELMET SHIELD

## (undated) DEVICE — ELCTR BIPOLAR CORD 12FT

## (undated) DEVICE — DRSG ACE BANDAGE 6"

## (undated) DEVICE — ELCTR BOVIE TIP NEEDLE INSULATED 2.8" EDGE

## (undated) DEVICE — MIXER BONE CEMENT EVAC III

## (undated) DEVICE — CRYO/CUFF GRAVITY COOLER KNEE LARGE

## (undated) DEVICE — SOL IRR POUR NS 0.9% 1000ML

## (undated) DEVICE — WARMING BLANKET UPPER ADULT

## (undated) DEVICE — SUT VICRYL PLUS 0 27" CT-2 UNDYED